# Patient Record
Sex: MALE | Race: BLACK OR AFRICAN AMERICAN | Employment: OTHER | URBAN - METROPOLITAN AREA
[De-identification: names, ages, dates, MRNs, and addresses within clinical notes are randomized per-mention and may not be internally consistent; named-entity substitution may affect disease eponyms.]

---

## 2017-02-02 ENCOUNTER — OFFICE VISIT (OUTPATIENT)
Dept: FAMILY MEDICINE CLINIC | Age: 67
End: 2017-02-02

## 2017-02-02 VITALS
HEIGHT: 69 IN | RESPIRATION RATE: 12 BRPM | WEIGHT: 136 LBS | OXYGEN SATURATION: 98 % | TEMPERATURE: 97.7 F | DIASTOLIC BLOOD PRESSURE: 87 MMHG | SYSTOLIC BLOOD PRESSURE: 131 MMHG | HEART RATE: 68 BPM | BODY MASS INDEX: 20.14 KG/M2

## 2017-02-02 DIAGNOSIS — J40 BRONCHITIS: Primary | ICD-10-CM

## 2017-02-02 RX ORDER — ALBUTEROL SULFATE 0.83 MG/ML
2.5 SOLUTION RESPIRATORY (INHALATION) ONCE
Qty: 1 EACH | Refills: 0
Start: 2017-02-02 | End: 2017-02-02

## 2017-02-02 RX ORDER — CEFDINIR 300 MG/1
300 CAPSULE ORAL 2 TIMES DAILY
Qty: 20 CAP | Refills: 0 | Status: SHIPPED | OUTPATIENT
Start: 2017-02-02 | End: 2017-02-12

## 2017-02-02 RX ORDER — PREDNISONE 10 MG/1
TABLET ORAL
Qty: 1 PACKAGE | Refills: 0 | Status: SHIPPED | OUTPATIENT
Start: 2017-02-02 | End: 2017-05-17

## 2017-02-02 NOTE — PROGRESS NOTES
Here for cough with brown sputum and congestion x 4 weeks. States that he has taken 2 rounds of Mucinex with no relief.

## 2017-02-02 NOTE — PATIENT INSTRUCTIONS
Bronchitis: Care Instructions  Your Care Instructions    Bronchitis is inflammation of the bronchial tubes, which carry air to the lungs. The tubes swell and produce mucus, or phlegm. The mucus and inflamed bronchial tubes make you cough. You may have trouble breathing. Most cases of bronchitis are caused by viruses like those that cause colds. Antibiotics usually do not help and they may be harmful. Bronchitis usually develops rapidly and lasts about 2 to 3 weeks in otherwise healthy people. Follow-up care is a key part of your treatment and safety. Be sure to make and go to all appointments, and call your doctor if you are having problems. It's also a good idea to know your test results and keep a list of the medicines you take. How can you care for yourself at home? · Take all medicines exactly as prescribed. Call your doctor if you think you are having a problem with your medicine. · Get some extra rest.  · Take an over-the-counter pain medicine, such as acetaminophen (Tylenol), ibuprofen (Advil, Motrin), or naproxen (Aleve) to reduce fever and relieve body aches. Read and follow all instructions on the label. · Do not take two or more pain medicines at the same time unless the doctor told you to. Many pain medicines have acetaminophen, which is Tylenol. Too much acetaminophen (Tylenol) can be harmful. · Take an over-the-counter cough medicine that contains dextromethorphan to help quiet a dry, hacking cough so that you can sleep. Avoid cough medicines that have more than one active ingredient. Read and follow all instructions on the label. · Breathe moist air from a humidifier, hot shower, or sink filled with hot water. The heat and moisture will thin mucus so you can cough it out. · Do not smoke. Smoking can make bronchitis worse. If you need help quitting, talk to your doctor about stop-smoking programs and medicines. These can increase your chances of quitting for good.   When should you call for help? Call 911 anytime you think you may need emergency care. For example, call if:  · You have severe trouble breathing. Call your doctor now or seek immediate medical care if:  · You have new or worse trouble breathing. · You cough up dark brown or bloody mucus (sputum). · You have a new or higher fever. · You have a new rash. Watch closely for changes in your health, and be sure to contact your doctor if:  · You cough more deeply or more often, especially if you notice more mucus or a change in the color of your mucus. · You are not getting better as expected. Where can you learn more? Go to http://yony-charla.info/. Enter H333 in the search box to learn more about \"Bronchitis: Care Instructions. \"  Current as of: May 23, 2016  Content Version: 11.1  © 2685-4775 [employer]. Care instructions adapted under license by [employer] (which disclaims liability or warranty for this information). If you have questions about a medical condition or this instruction, always ask your healthcare professional. [address] any warranty or liability for your use of this information.

## 2017-02-02 NOTE — PROGRESS NOTES
Chief Complaint   Patient presents with    Cough with sputum    Nasal Congestion     he is a 61y.o. year old male who presents for evalution. Pt states has been having cough and congestion for past 4 weeks. Has been taking Mucinex D but not helping at all. Wheezing and chest tightness for past 2 weeks. Worse at night time. No fever or chills. Reviewed PmHx, RxHx, FmHx, SocHx, AllgHx and updated and dated in the chart. Review of Systems - negative except as listed above in the HPI    Objective:     Vitals:    02/02/17 0839   BP: 131/87   Pulse: 68   Resp: 12   Temp: 97.7 °F (36.5 °C)   SpO2: 98%   Weight: 136 lb (61.7 kg)   Height: 5' 9\" (1.753 m)     Physical Examination: General appearance - alert, well appearing, and in no distress  Ears - bilateral TM's and external ear canals normal  Nose - normal nontender sinuses, mucosal congestion and mucosal erythema  Mouth - mucous membranes moist, pharynx normal without lesions and erythematous  Neck - supple, no significant adenopathy  Chest - clear to auscultation, no wheezes, rales or rhonchi, symmetric air entry, decreased air entry noted generalized, slight improvement after neb   Heart - normal rate, regular rhythm, normal S1, S2, no murmurs, rubs, clicks or gallops    Assessment/ Plan:   Jessa Mcfarland was seen today for cough with sputum and nasal congestion. Diagnoses and all orders for this visit:    Bronchitis  -     albuterol (PROVENTIL VENTOLIN) 2.5 mg /3 mL (0.083 %) nebulizer solution; 3 mL by Nebulization route once for 1 dose. -     ALBUTEROL, INHAL. WQO.()  -     INHAL RX, AIRWAY OBST/DX SPUTUM INDUCT ([id number])  -     cefdinir (OMNICEF) 300 mg capsule; Take 1 Cap by mouth two (2) times a day for 10 days. -     predniSONE (STERAPRED DS) 10 mg dose pack; Use as directed on packaging x 12 days  New rxs. Discussed and encouraged rest and clear fluids, if congestion is thick should avoid dairy.   Recommended hot showers with steam and elevating head of bed. May use Vitamin C or Echinacea in addition to current therapy. Pt voiced understanding regarding plan of care. Follow-up Disposition:  Return if symptoms worsen or fail to improve. I have discussed the diagnosis with the patient and the intended plan as seen in the above orders. The patient has received an after-visit summary and questions were answered concerning future plans.      Medication Side Effects and Warnings were discussed with patient    Karoline Prabhakar NP

## 2017-02-02 NOTE — MR AVS SNAPSHOT
Visit Information Date & Time Provider Department Dept. Phone Encounter #  
 2/2/2017  8:30 AM Tamara Trinh NP [address] [facility] [phone] [id number] Follow-up Instructions Return if symptoms worsen or fail to improve. Upcoming Health Maintenance Date Due Hepatitis C Screening 1950 Pneumococcal 19-64 Medium Risk (1 of 1 - PPSV23) 8/20/1971 DTaP/Tdap/Td series (1 - Tdap) 8/20/1973 HEMOGLOBIN A1C Q6M 9/25/2016 LIPID PANEL Q1 3/25/2017 COLONOSCOPY 8/5/2018 Allergies as of 2/2/2017  Review Complete On: 2/2/2017 By: Tamara Trinh NP Severity Noted Reaction Type Reactions Pcn [Penicillins]  05/25/2010    Other (comments) Lock face Sulfa (Sulfonamide Antibiotics)  05/25/2010    Rash Current Immunizations  Reviewed on 10/20/2016 Name Date Influenza Vaccine 10/14/2016, 9/1/2015 Influenza Vaccine Split 10/9/2012, 11/4/2011, 11/18/2010 Not reviewed this visit You Were Diagnosed With   
  
 Codes Comments Bronchitis    -  Primary ICD-10-CM: A30 ICD-9-CM: 305 Vitals BP Pulse Temp Resp Height(growth percentile) Weight(growth percentile) 131/87 68 97.7 °F (36.5 °C) 12 5' 9\" (1.753 m) 136 lb (61.7 kg) SpO2 BMI Smoking Status 98% 20.08 kg/m2 Former Smoker Vitals History BMI and BSA Data Body Mass Index Body Surface Area 20.08 kg/m 2 1.73 m 2 Preferred Pharmacy Pharmacy Name Phone [facility] [address] [phone] Your Updated Medication List  
  
   
This list is accurate as of: 2/2/17  9:06 AM.  Always use your most recent med list.  
  
  
  
  
 albuterol 2.5 mg /3 mL (0.083 %) nebulizer solution Commonly known as:  PROVENTIL VENTOLIN  
3 mL by Nebulization route once for 1 dose. amLODIPine 10 mg tablet Commonly known as:  Kath Reyes Take 1 Tab by mouth daily. (dc the 30 day supply already sent) aspirin 81 mg tablet Take 81 mg by mouth daily. atorvastatin 20 mg tablet Commonly known as:  LIPITOR Take 1 Tab by mouth daily. cefdinir 300 mg capsule Commonly known as:  OMNICEF Take 1 Cap by mouth two (2) times a day for 10 days. CENTRUM SILVER Tab tablet Generic drug:  multivitamins-minerals-lutein Take  by mouth.  
  
 cloNIDine HCl 0.2 mg tablet Commonly known as:  CATAPRES Take 1 Tab by mouth two (2) times a day. EFFIENT 10 mg tablet Generic drug:  prasugrel Take 10 mg by mouth daily. glucose blood VI test strips strip Commonly known as:  ONETOUCH ULTRA TEST  
by Does Not Apply route. Needs strips for one touch ultra 2 Once a day testing  
  
 hydroCHLOROthiazide 25 mg tablet Commonly known as:  HYDRODIURIL  
TAKE 1 TABLET ONE TIME DAILY Lancets Misc Commonly known as:  ONETOUCH ULTRASOFT LANCETS  
by Does Not Apply route. Checks bg daily  
  
 lisinopril 20 mg tablet Commonly known as:  PRINIVIL, ZESTRIL  
TAKE 1 TABLET EVERY DAY  
  
 loratadine 10 mg tablet Commonly known as:  CLARITIN  
take 1 tablet by mouth once daily  
  
 metFORMIN  mg tablet Commonly known as:  GLUCOPHAGE XR Take 1 Tab by mouth daily (with dinner). metoprolol succinate 100 mg tablet Commonly known as:  TOPROL-XL Take 1 Tab by mouth daily. NITROSTAT 0.4 mg SL tablet Generic drug:  nitroglycerin  
  
 predniSONE 10 mg dose pack Commonly known as:  STERAPRED DS Use as directed on packaging x 12 days Prescriptions Sent to Pharmacy Refills  
 cefdinir (OMNICEF) 300 mg capsule 0 Sig: Take 1 Cap by mouth two (2) times a day for 10 days. Class: Normal  
 [facility] Ph #: [phone]  Route: Oral  
 predniSONE (STERAPRED DS) 10 mg dose pack 0  
 Sig: Use as directed on packaging x 12 days Class: Normal  
 [facility]  #: [phone] We Performed the Following ALBUTEROL, INHAL. SOL., FDA-APPROVED FINAL, NON-COMPOUND UNIT DOSE, 1 MG [ [facility]] INHAL RX, AIRWAY OBST/DX SPUTUM INDUCT [id number] CPT(R)] Follow-up Instructions Return if symptoms worsen or fail to improve. Patient Instructions Bronchitis: Care Instructions Your Care Instructions Bronchitis is inflammation of the bronchial tubes, which carry air to the lungs. The tubes swell and produce mucus, or phlegm. The mucus and inflamed bronchial tubes make you cough. You may have trouble breathing. Most cases of bronchitis are caused by viruses like those that cause colds. Antibiotics usually do not help and they may be harmful. Bronchitis usually develops rapidly and lasts about 2 to 3 weeks in otherwise healthy people. Follow-up care is a key part of your treatment and safety. Be sure to make and go to all appointments, and call your doctor if you are having problems. It's also a good idea to know your test results and keep a list of the medicines you take. How can you care for yourself at home? · Take all medicines exactly as prescribed. Call your doctor if you think you are having a problem with your medicine. · Get some extra rest. 
· Take an over-the-counter pain medicine, such as acetaminophen (Tylenol), ibuprofen (Advil, Motrin), or naproxen (Aleve) to reduce fever and relieve body aches. Read and follow all instructions on the label. · Do not take two or more pain medicines at the same time unless the doctor told you to. Many pain medicines have acetaminophen, which is Tylenol. Too much acetaminophen (Tylenol) can be harmful. · Take an over-the-counter cough medicine that contains dextromethorphan to help quiet a dry, hacking cough so that you can sleep.  Avoid cough medicines that have more than one active ingredient. Read and follow all instructions on the label. · Breathe moist air from a humidifier, hot shower, or sink filled with hot water. The heat and moisture will thin mucus so you can cough it out. · Do not smoke. Smoking can make bronchitis worse. If you need help quitting, talk to your doctor about stop-smoking programs and medicines. These can increase your chances of quitting for good. When should you call for help? Call 911 anytime you think you may need emergency care. For example, call if: 
· You have severe trouble breathing. Call your doctor now or seek immediate medical care if: 
· You have new or worse trouble breathing. · You cough up dark brown or bloody mucus (sputum). · You have a new or higher fever. · You have a new rash. Watch closely for changes in your health, and be sure to contact your doctor if: 
· You cough more deeply or more often, especially if you notice more mucus or a change in the color of your mucus. · You are not getting better as expected. Where can you learn more? Go to http://yony-charla.info/. Enter H333 in the search box to learn more about \"Bronchitis: Care Instructions. \" Current as of: May 23, 2016 Content Version: 11.1 © 6923-4177 [employer]. Care instructions adapted under license by [employer] (which disclaims liability or warranty for this information). If you have questions about a medical condition or this instruction, always ask your healthcare professional. [place] [address] any warranty or liability for your use of this information. Introducing hospitals & HEALTH SERVICES! Mai Bullard introduces [employer] patient portal. Now you can access parts of your medical record, email your doctor's office, and request medication refills online. 1. In your internet browser, go to [email or web address]. [email or web address] 2. Click on the First Time User? Click Here link in the Sign In box. You will see the New Member Sign Up page. 3. Enter your M[employer]Space Access Code exactly as it appears below. You will not need to use this code after youve completed the sign-up process. If you do not sign up before the expiration date, you must request a new code. · [employer] Access Code: [email or web address] Expires: 5/3/2017  9:00 AM 
 
4. Enter the last four digits of your Social Security Number (xxxx) and Date of Birth (mm/dd/yyyy) as indicated and click Submit. You will be taken to the next sign-up page. 5. Create a MocoSpace ID. This will be your M[employer]Space login ID and cannot be changed, so think of one that is secure and easy to remember. 6. Create a [employer] password. You can change your password at any time. 7. Enter your Password Reset Question and Answer. This can be used at a later time if you forget your password. 8. Enter your e-mail address. You will receive e-mail notification when new information is available in [address] 9. Click Sign Up. You can now view and download portions of your medical record. 10. Click the Download Summary menu link to download a portable copy of your medical information. If you have questions, please visit the Frequently Asked Questions section of the [employer] website. Remember, [employer] is NOT to be used for urgent needs. For medical emergencies, dial 911. Now available from your iPhone and Android! Please provide this summary of care documentation to your next provider. Your primary care clinician is listed as Brigido Dockery. If you have any questions after today's visit, please call [phone].

## 2017-02-09 ENCOUNTER — OFFICE VISIT (OUTPATIENT)
Dept: FAMILY MEDICINE CLINIC | Age: 67
End: 2017-02-09

## 2017-02-09 VITALS
OXYGEN SATURATION: 98 % | BODY MASS INDEX: 21.03 KG/M2 | SYSTOLIC BLOOD PRESSURE: 137 MMHG | HEART RATE: 74 BPM | RESPIRATION RATE: 12 BRPM | WEIGHT: 142 LBS | HEIGHT: 69 IN | TEMPERATURE: 97.9 F | DIASTOLIC BLOOD PRESSURE: 66 MMHG

## 2017-02-09 DIAGNOSIS — J18.9 CAP (COMMUNITY ACQUIRED PNEUMONIA): Primary | ICD-10-CM

## 2017-02-09 RX ORDER — ALBUTEROL SULFATE 90 UG/1
2 AEROSOL, METERED RESPIRATORY (INHALATION)
Qty: 1 INHALER | Refills: 0 | Status: SHIPPED | OUTPATIENT
Start: 2017-02-09

## 2017-02-09 RX ORDER — LEVOFLOXACIN 750 MG/1
750 TABLET ORAL DAILY
Qty: 7 TAB | Refills: 0 | Status: SHIPPED | OUTPATIENT
Start: 2017-02-09 | End: 2017-02-16

## 2017-02-09 RX ORDER — ALBUTEROL SULFATE 0.83 MG/ML
2.5 SOLUTION RESPIRATORY (INHALATION)
Qty: 24 EACH | Refills: 0 | Status: SHIPPED | OUTPATIENT
Start: 2017-02-09

## 2017-02-09 NOTE — MR AVS SNAPSHOT
Visit Information Date & Time Provider Department Dept. Phone Encounter #  
 2/9/2017  3:50 PM Alonzo Mauricio, NP [address] [facility] [phone] [id number] Follow-up Instructions Return if symptoms worsen or fail to improve. Upcoming Health Maintenance Date Due Hepatitis C Screening 1950 Pneumococcal 19-64 Medium Risk (1 of 1 - PPSV23) 8/20/1971 DTaP/Tdap/Td series (1 - Tdap) 8/20/1973 HEMOGLOBIN A1C Q6M 9/25/2016 LIPID PANEL Q1 3/25/2017 COLONOSCOPY 8/5/2018 Allergies as of 2/9/2017  Review Complete On: 2/9/2017 By: Tae Toro LPN Severity Noted Reaction Type Reactions Pcn [Penicillins]  05/25/2010    Other (comments) Lock face Sulfa (Sulfonamide Antibiotics)  05/25/2010    Rash Current Immunizations  Reviewed on 10/20/2016 Name Date Influenza Vaccine 10/14/2016, 9/1/2015 Influenza Vaccine Split 10/9/2012, 11/4/2011, 11/18/2010 Not reviewed this visit You Were Diagnosed With   
  
 Codes Comments CAP (community acquired pneumonia)    -  Primary ICD-10-CM: J18.9 ICD-9-CM: 438 Vitals BP Pulse Temp Resp Height(growth percentile) Weight(growth percentile)  
 137/66 74 97.9 °F (36.6 °C) 12 5' 9\" (1.753 m) 142 lb (64.4 kg) SpO2 BMI Smoking Status 98% 20.97 kg/m2 Former Smoker Vitals History BMI and BSA Data Body Mass Index Body Surface Area  
 20.97 kg/m 2 1.77 m 2 Preferred Pharmacy Pharmacy Name Phone [facility] [address] [phone] Your Updated Medication List  
  
   
This list is accurate as of: 2/9/17  4:12 PM.  Always use your most recent med list.  
  
  
  
  
 albuterol 90 mcg/actuation inhaler Commonly known as:  PROVENTIL HFA, VENTOLIN HFA, PROAIR HFA Take 2 Puffs by inhalation every four (4) hours as needed for Wheezing or Shortness of Breath. amLODIPine 10 mg tablet Commonly known as:  Jose Francisco Radu Take 1 Tab by mouth daily. (dc the 30 day supply already sent) aspirin 81 mg tablet Take 81 mg by mouth daily. atorvastatin 20 mg tablet Commonly known as:  LIPITOR Take 1 Tab by mouth daily. cefdinir 300 mg capsule Commonly known as:  OMNICEF Take 1 Cap by mouth two (2) times a day for 10 days. CENTRUM SILVER Tab tablet Generic drug:  multivitamins-minerals-lutein Take  by mouth.  
  
 cloNIDine HCl 0.2 mg tablet Commonly known as:  CATAPRES Take 1 Tab by mouth two (2) times a day. EFFIENT 10 mg tablet Generic drug:  prasugrel Take 10 mg by mouth daily. glucose blood VI test strips strip Commonly known as:  ONETOUCH ULTRA TEST  
by Does Not Apply route. Needs strips for one touch ultra 2 Once a day testing  
  
 hydroCHLOROthiazide 25 mg tablet Commonly known as:  HYDRODIURIL  
TAKE 1 TABLET ONE TIME DAILY Lancets Misc Commonly known as:  ONETOUCH ULTRASOFT LANCETS  
by Does Not Apply route. Checks bg daily  
  
 levoFLOXacin 750 mg tablet Commonly known as:  Rabia Guile Take 1 Tab by mouth daily for 7 days. lisinopril 20 mg tablet Commonly known as:  PRINIVIL, ZESTRIL  
TAKE 1 TABLET EVERY DAY  
  
 loratadine 10 mg tablet Commonly known as:  CLARITIN  
take 1 tablet by mouth once daily  
  
 metFORMIN  mg tablet Commonly known as:  GLUCOPHAGE XR Take 1 Tab by mouth daily (with dinner). metoprolol succinate 100 mg tablet Commonly known as:  TOPROL-XL Take 1 Tab by mouth daily. NITROSTAT 0.4 mg SL tablet Generic drug:  nitroglycerin  
  
 predniSONE 10 mg dose pack Commonly known as:  STERAPRED DS Use as directed on packaging x 12 days Prescriptions Sent to Pharmacy Refills  
 levoFLOXacin (LEVAQUIN) 750 mg tablet 0 Sig: Take 1 Tab by mouth daily for 7 days.   
 Class: Normal  
 Pharmacy: [facility], [address] [place] [address] ROAD Ph #: [phone] Route: Oral  
 albuterol (PROVENTIL HFA, VENTOLIN HFA, PROAIR HFA) 90 mcg/actuation inhaler 0 Sig: Take 2 Puffs by inhalation every four (4) hours as needed for Wheezing or Shortness of Breath. Class: Normal  
 [facility] Ph #: [phone] Route: Inhalation Follow-up Instructions Return if symptoms worsen or fail to improve. To-Do List   
 02/10/2017 Imaging:  XR CHEST PA LAT Patient Instructions Pneumonia: Care Instructions Your Care Instructions Pneumonia is an infection of the lungs. Most cases are caused by infections from bacteria or viruses. Pneumonia may be mild or very severe. If it is caused by bacteria, you will be treated with antibiotics. It may take a few weeks to a few months to recover fully from pneumonia, depending on how sick you were and whether your overall health is good. Follow-up care is a key part of your treatment and safety. Be sure to make and go to all appointments, and call your doctor if you are having problems. Its also a good idea to know your test results and keep a list of the medicines you take. How can you care for yourself at home? · Take your antibiotics exactly as directed. Do not stop taking the medicine just because you are feeling better. You need to take the full course of antibiotics. · Take your medicines exactly as prescribed. Call your doctor if you think you are having a problem with your medicine. · Get plenty of rest and sleep. You may feel weak and tired for a while, but your energy level will improve with time. · To prevent dehydration, drink plenty of fluids, enough so that your urine is light yellow or clear like water. Choose water and other caffeine-free clear liquids until you feel better.  If you have kidney, heart, or liver disease and have to limit fluids, talk with your doctor before you increase the amount of fluids you drink. · Take care of your cough so you can rest. A cough that brings up mucus from your lungs is common with pneumonia. It is one way your body gets rid of the infection. But if coughing keeps you from resting or causes severe fatigue and chest-wall pain, talk to your doctor. He or she may suggest that you take a medicine to reduce the cough. · Use a vaporizer or humidifier to add moisture to your bedroom. Follow the directions for cleaning the machine. · Do not smoke or allow others to smoke around you. Smoke will make your cough last longer. If you need help quitting, talk to your doctor about stop-smoking programs and medicines. These can increase your chances of quitting for good. · Take an over-the-counter pain medicine, such as acetaminophen (Tylenol), ibuprofen (Advil, Motrin), or naproxen (Aleve). Read and follow all instructions on the label. · Do not take two or more pain medicines at the same time unless the doctor told you to. Many pain medicines have acetaminophen, which is Tylenol. Too much acetaminophen (Tylenol) can be harmful. · If you were given a spirometer to measure how well your lungs are working, use it as instructed. This can help your doctor tell how your recovery is going. · To prevent pneumonia in the future, talk to your doctor about getting a flu vaccine (once a year) and a pneumococcal vaccine (one time only for most people). When should you call for help? Call 911 anytime you think you may need emergency care. For example, call if: 
· You have severe trouble breathing. Call your doctor now or seek immediate medical care if: 
· You cough up dark brown or bloody mucus (sputum). · You have new or worse trouble breathing. · You are dizzy or lightheaded, or you feel like you may faint. Watch closely for changes in your health, and be sure to contact your doctor if: 
· You have a new or higher fever. · You are coughing more deeply or more often. · You are not getting better after 2 days (48 hours). · You do not get better as expected. Where can you learn more? Go to http://yony-charla.info/. Enter [id number] in the search box to learn more about \"Pneumonia: Care Instructions. \" Current as of: May 23, 2016 Content Version: 11.1 © 3597-1029 [employer]. Care instructions adapted under license by [employer] (which disclaims liability or warranty for this information). If you have questions about a medical condition or this instruction, always ask your healthcare professional. [address] any warranty or liability for your use of this information. Introducing [facility]! Miguel Leal introduces [employer] patient portal. Now you can access parts of your medical record, email your doctor's office, and request medication refills online. 1. In your internet browser, go to [email or web address] 2. Click on the First Time User? Click Here link in the Sign In box. You will see the New Member Sign Up page. 3. Enter your [employer] Access Code exactly as it appears below. You will not need to use this code after youve completed the sign-up process. If you do not sign up before the expiration date, you must request a new code. · [employer] Access Code: [email or web address] Expires: 5/3/2017  9:00 AM 
 
4. Enter the last four digits of your Social Security Number (xxxx) and Date of Birth (mm/dd/yyyy) as indicated and click Submit. You will be taken to the next sign-up page. 5. Create a [employer] ID. This will be your [employer] login ID and cannot be changed, so think of one that is secure and easy to remember. 6. Create a [employer] password. You can change your password at any time. 7. Enter your Password Reset Question and Answer. This can be used at a later time if you forget your password. 8. Enter your e-mail address. You will receive e-mail notification when new information is available in [address] 9. Click Sign Up. You can now view and download portions of your medical record. 10. Click the Download Summary menu link to download a portable copy of your medical information. If you have questions, please visit the Frequently Asked Questions section of the [employer] website. Remember, [employer] is NOT to be used for urgent needs. For medical emergencies, dial 911. Now available from your iPhone and Android! Please provide this summary of care documentation to your next provider. Your primary care clinician is listed as Dre Eng. If you have any questions after today's visit, please call [phone].

## 2017-02-09 NOTE — PATIENT INSTRUCTIONS
Pneumonia: Care Instructions  Your Care Instructions    Pneumonia is an infection of the lungs. Most cases are caused by infections from bacteria or viruses. Pneumonia may be mild or very severe. If it is caused by bacteria, you will be treated with antibiotics. It may take a few weeks to a few months to recover fully from pneumonia, depending on how sick you were and whether your overall health is good. Follow-up care is a key part of your treatment and safety. Be sure to make and go to all appointments, and call your doctor if you are having problems. Its also a good idea to know your test results and keep a list of the medicines you take. How can you care for yourself at home? · Take your antibiotics exactly as directed. Do not stop taking the medicine just because you are feeling better. You need to take the full course of antibiotics. · Take your medicines exactly as prescribed. Call your doctor if you think you are having a problem with your medicine. · Get plenty of rest and sleep. You may feel weak and tired for a while, but your energy level will improve with time. · To prevent dehydration, drink plenty of fluids, enough so that your urine is light yellow or clear like water. Choose water and other caffeine-free clear liquids until you feel better. If you have kidney, heart, or liver disease and have to limit fluids, talk with your doctor before you increase the amount of fluids you drink. · Take care of your cough so you can rest. A cough that brings up mucus from your lungs is common with pneumonia. It is one way your body gets rid of the infection. But if coughing keeps you from resting or causes severe fatigue and chest-wall pain, talk to your doctor. He or she may suggest that you take a medicine to reduce the cough. · Use a vaporizer or humidifier to add moisture to your bedroom. Follow the directions for cleaning the machine. · Do not smoke or allow others to smoke around you.  Smoke will make your cough last longer. If you need help quitting, talk to your doctor about stop-smoking programs and medicines. These can increase your chances of quitting for good. · Take an over-the-counter pain medicine, such as acetaminophen (Tylenol), ibuprofen (Advil, Motrin), or naproxen (Aleve). Read and follow all instructions on the label. · Do not take two or more pain medicines at the same time unless the doctor told you to. Many pain medicines have acetaminophen, which is Tylenol. Too much acetaminophen (Tylenol) can be harmful. · If you were given a spirometer to measure how well your lungs are working, use it as instructed. This can help your doctor tell how your recovery is going. · To prevent pneumonia in the future, talk to your doctor about getting a flu vaccine (once a year) and a pneumococcal vaccine (one time only for most people). When should you call for help? Call 911 anytime you think you may need emergency care. For example, call if:  · You have severe trouble breathing. Call your doctor now or seek immediate medical care if:  · You cough up dark brown or bloody mucus (sputum). · You have new or worse trouble breathing. · You are dizzy or lightheaded, or you feel like you may faint. Watch closely for changes in your health, and be sure to contact your doctor if:  · You have a new or higher fever. · You are coughing more deeply or more often. · You are not getting better after 2 days (48 hours). · You do not get better as expected. Where can you learn more? Go to http://yony-charla.info/. Enter [id number] in the search box to learn more about \"Pneumonia: Care Instructions. \"  Current as of: May 23, 2016  Content Version: 11.1  © 3203-0634 [employer]. Care instructions adapted under license by [employer] (which disclaims liability or warranty for this information).  If you have questions about a medical condition or this instruction, always ask your healthcare professional. [place] [address] any warranty or liability for your use of this information.

## 2017-02-09 NOTE — PROGRESS NOTES
Here for a follow up on congestion. States that the med he was given at the last visit did not work.

## 2017-02-09 NOTE — PROGRESS NOTES
Chief Complaint   Patient presents with    Nasal Congestion     he is a 61y.o. year old male who presents for evalution. Pt seen here a week ago for Bronchitis, started on Omnicef and Prednisone. Pt states course continues to worsen, productive cough - has brought in bottle with everything he has coughed up today. Feels feverish but hasn't checked temperature at home. Feels some chest tightness but mostly just cough is terrible. Pt upset because he did not get antibiotic over 600mg and because he did not smell antibiotic in his urine. Reviewed PmHx, RxHx, FmHx, SocHx, AllgHx and updated and dated in the chart. Review of Systems - negative except as listed above in the HPI    Objective:     Vitals:    02/09/17 1550   BP: 137/66   Pulse: 74   Resp: 12   Temp: 97.9 °F (36.6 °C)   SpO2: 98%   Weight: 142 lb (64.4 kg)   Height: 5' 9\" (1.753 m)     Physical Examination: General appearance - alert, well appearing, and in no distress  Ears - bilateral TM's and external ear canals normal  Nose - normal nontender sinuses, mucosal congestion and mucosal erythema  Mouth - mucous membranes moist, pharynx normal without lesions and erythematous  Neck - supple, no significant adenopathy  Chest - wheezing noted mild, decreased air entry noted moderate, improved slightly after neb   Heart - normal rate, regular rhythm, normal S1, S2, no murmurs, rubs, clicks or gallops    Assessment/ Plan:   Leisa Robert was seen today for nasal congestion. Diagnoses and all orders for this visit:    CAP (community acquired pneumonia)  -     levoFLOXacin (LEVAQUIN) 750 mg tablet; Take 1 Tab by mouth daily for 7 days. -     albuterol (PROVENTIL HFA, VENTOLIN HFA, PROAIR HFA) 90 mcg/actuation inhaler; Take 2 Puffs by inhalation every four (4) hours as needed for Wheezing or Shortness of Breath. -     XR CHEST PA LAT;  Future  -     albuterol (PROVENTIL VENTOLIN) 2.5 mg /3 mL (0.083 %) nebulizer solution; 3 mL by Nebulization route every four (4) hours as needed for Wheezing. New antibiotic, start on nebulizer if has grandchild's available - if not then use inhaler. Obtain x-ray on Monday if no improvement. Pt voiced understanding. Pt voiced understanding regarding plan of care. Follow-up Disposition:  Return if symptoms worsen or fail to improve. I have discussed the diagnosis with the patient and the intended plan as seen in the above orders. The patient has received an after-visit summary and questions were answered concerning future plans.      Medication Side Effects and Warnings were discussed with patient    Cecelia Le NP

## 2017-03-22 ENCOUNTER — OFFICE VISIT (OUTPATIENT)
Dept: FAMILY MEDICINE CLINIC | Age: 67
End: 2017-03-22

## 2017-03-22 VITALS
HEART RATE: 62 BPM | SYSTOLIC BLOOD PRESSURE: 106 MMHG | WEIGHT: 138.5 LBS | DIASTOLIC BLOOD PRESSURE: 64 MMHG | OXYGEN SATURATION: 100 % | TEMPERATURE: 97.3 F | HEIGHT: 69 IN | RESPIRATION RATE: 16 BRPM | BODY MASS INDEX: 20.51 KG/M2

## 2017-03-22 DIAGNOSIS — J30.2 SEASONAL ALLERGIC RHINITIS, UNSPECIFIED ALLERGIC RHINITIS TRIGGER: ICD-10-CM

## 2017-03-22 DIAGNOSIS — J01.00 ACUTE MAXILLARY SINUSITIS, RECURRENCE NOT SPECIFIED: Primary | ICD-10-CM

## 2017-03-22 RX ORDER — CEFDINIR 300 MG/1
300 CAPSULE ORAL 2 TIMES DAILY
Qty: 20 CAP | Refills: 0 | Status: SHIPPED | OUTPATIENT
Start: 2017-03-22 | End: 2017-04-01

## 2017-03-22 RX ORDER — LORATADINE 10 MG/1
TABLET ORAL
Qty: 90 TAB | Refills: 3 | Status: SHIPPED | OUTPATIENT
Start: 2017-03-22

## 2017-03-22 NOTE — PROGRESS NOTES
HPI/ROS  Patient complains of bilateral ear pressure/pain. Symptoms include congestion, headache described as frontal, lightheadedness, low grade fever, post nasal drip, productive cough with  yellow colored sputum, sinus pressure, tooth pain and vertigo. Onset of symptoms was 5 days ago, gradually worsening since that time. Patient is drinking plenty of fluids. .  Past history is significant for no history of pneumonia or bronchitis. Patient is non-smoker. He is not taking his claritin currently, ran out for the season. No other cold meds on board. Visit Vitals    /64    Pulse 62    Temp 97.3 °F (36.3 °C) (Oral)    Resp 16    Ht 5' 9\" (1.753 m)    Wt 138 lb 8 oz (62.8 kg)    SpO2 100%    BMI 20.45 kg/m2     Physical Examination:   GENERAL ASSESSMENT: well developed and well nourished  SKIN: normal color, no lesions  HEAD: normocephalic  EYES: normal eyes  EARS: external auditory canal: clear and tympanic membrane: yellow, bulging  NOSE: normal external appearance and nares patent  MOUTH: yellow exudates of OP  NECK: normal  CHEST: normal air exchange, no rales, no rhonchi, no wheezes, respiratory effort normal with no retractions  HEART: regular rate and rhythm, normal S1/S2, no murmurs  ABDOMEN:  not examined  EXTREMITY: not examined  NEURO: not examined    Ethan Torres was seen today for follow-up. Diagnoses and all orders for this visit:    Acute maxillary sinusitis, recurrence not specified    Seasonal allergic rhinitis, unspecified allergic rhinitis trigger    Other orders  -     loratadine (CLARITIN) 10 mg tablet; take 1 tablet by mouth once daily  -     cefdinir (OMNICEF) 300 mg capsule; Take 1 Cap by mouth two (2) times a day for 10 days. Reveiwed adr/se of medication  Push fluids, rest, suggested mucinex for congestion and drainage  Recheck 5-7 days if sx not improved. I have discussed the diagnosis with the patient and the intended plan as seen in the above orders.  The patient has received an after-visit summary and questions were answered concerning future plans. Patient conveyed understanding of the plan at the time of the visit.     Adam Kamara, MSN, ANP  3/22/2017

## 2017-03-22 NOTE — PROGRESS NOTES
1. Have you been to the ER, urgent care clinic since your last visit? Hospitalized since your last visit? No    2. Have you seen or consulted any other health care providers outside of the [address] [place] [address] since your last visit? Include any pap smears or colon screening.  No    Chief Complaint   Patient presents with    Follow-up     sinus congestion, eyes itch, productive cough x 4 mo

## 2017-03-22 NOTE — MR AVS SNAPSHOT
Visit Information Date & Time Provider Department Dept. Phone Encounter #  
 3/22/2017  8:30 AM Katya Holt, SALONI [address] [facility] [phone] [id number] Upcoming Health Maintenance Date Due Hepatitis C Screening 1950 Pneumococcal 19-64 Medium Risk (1 of 1 - PPSV23) 8/20/1971 DTaP/Tdap/Td series (1 - Tdap) 8/20/1973 HEMOGLOBIN A1C Q6M 9/25/2016 LIPID PANEL Q1 3/25/2017 COLONOSCOPY 8/5/2018 Allergies as of 3/22/2017  Review Complete On: 3/22/2017 By: Naomi Cowart LPN Severity Noted Reaction Type Reactions Pcn [Penicillins]  05/25/2010    Other (comments) Lock face Sulfa (Sulfonamide Antibiotics)  05/25/2010    Rash Current Immunizations  Reviewed on 10/20/2016 Name Date Influenza Vaccine 10/14/2016, 9/1/2015 Influenza Vaccine Split 10/9/2012, 11/4/2011, 11/18/2010 Not reviewed this visit You Were Diagnosed With   
  
 Codes Comments Acute maxillary sinusitis, recurrence not specified    -  Primary ICD-10-CM: J01.00 ICD-9-CM: 461.0 Seasonal allergic rhinitis, unspecified allergic rhinitis trigger     ICD-10-CM: J30.2 ICD-9-CM: 477.9 Vitals BP Pulse Temp Resp Height(growth percentile) Weight(growth percentile) 106/64 62 97.3 °F (36.3 °C) (Oral) 16 5' 9\" (1.753 m) 138 lb 8 oz (62.8 kg) SpO2 BMI Smoking Status 100% 20.45 kg/m2 Former Smoker Vitals History BMI and BSA Data Body Mass Index Body Surface Area  
 20.45 kg/m 2 1.75 m 2 Preferred Pharmacy Pharmacy Name Phone [facility] [address] [facility] [phone] Your Updated Medication List  
  
   
This list is accurate as of: 3/22/17  9:17 AM.  Always use your most recent med list.  
  
  
  
  
 * albuterol 90 mcg/actuation inhaler Commonly known as:  PROVENTIL HFA, VENTOLIN HFA, PROAIR HFA  
 Take 2 Puffs by inhalation every four (4) hours as needed for Wheezing or Shortness of Breath. * albuterol 2.5 mg /3 mL (0.083 %) nebulizer solution Commonly known as:  PROVENTIL VENTOLIN  
3 mL by Nebulization route every four (4) hours as needed for Wheezing. amLODIPine 10 mg tablet Commonly known as:  Reva Alie Take 1 Tab by mouth daily. (dc the 30 day supply already sent) aspirin 81 mg tablet Take 81 mg by mouth daily. atorvastatin 20 mg tablet Commonly known as:  LIPITOR Take 1 Tab by mouth daily. cefdinir 300 mg capsule Commonly known as:  OMNICEF Take 1 Cap by mouth two (2) times a day for 10 days. CENTRUM SILVER Tab tablet Generic drug:  multivitamins-minerals-lutein Take  by mouth.  
  
 cloNIDine HCl 0.2 mg tablet Commonly known as:  CATAPRES Take 1 Tab by mouth two (2) times a day. EFFIENT 10 mg tablet Generic drug:  prasugrel Take 10 mg by mouth daily. glucose blood VI test strips strip Commonly known as:  ONETOUCH ULTRA TEST  
by Does Not Apply route. Needs strips for one touch ultra 2 Once a day testing  
  
 hydroCHLOROthiazide 25 mg tablet Commonly known as:  HYDRODIURIL  
TAKE 1 TABLET ONE TIME DAILY Lancets Misc Commonly known as:  ONETOUCH ULTRASOFT LANCETS  
by Does Not Apply route. Checks bg daily  
  
 lisinopril 20 mg tablet Commonly known as:  PRINIVIL, ZESTRIL  
TAKE 1 TABLET EVERY DAY  
  
 loratadine 10 mg tablet Commonly known as:  CLARITIN  
take 1 tablet by mouth once daily  
  
 metFORMIN  mg tablet Commonly known as:  GLUCOPHAGE XR Take 1 Tab by mouth daily (with dinner). metoprolol succinate 100 mg tablet Commonly known as:  TOPROL-XL Take 1 Tab by mouth daily. NITROSTAT 0.4 mg SL tablet Generic drug:  nitroglycerin  
  
 predniSONE 10 mg dose pack Commonly known as:  STERAPRED DS Use as directed on packaging x 12 days * Notice: This list has 2 medication(s) that are the same as other medications prescribed for you. Read the directions carefully, and ask your doctor or other care provider to review them with you. Prescriptions Sent to Pharmacy Refills  
 loratadine (CLARITIN) 10 mg tablet 3 Sig: take 1 tablet by mouth once daily Class: Normal  
 [facility]: [facility] [place] [address] Ph #: [phone]  
 cefdinir (OMNICEF) 300 mg capsule 0 Sig: Take 1 Cap by mouth two (2) times a day for 10 days. Class: Normal  
 Pharmacy: [facility] Ph #: [phone] Route: Oral  
  
Introducing [facility]! [employer] introduces [employer] patient portal. Now you can access parts of your medical record, email your doctor's office, and request medication refills online. 1. In your internet browser, go to [email or web address] 2. Click on the First Time User? Click Here link in the Sign In box. You will see the New Member Sign Up page. 3. Enter your [employer] Access Code exactly as it appears below. You will not need to use this code after youve completed the sign-up process. If you do not sign up before the expiration date, you must request a new code. · [employer] Access Code: [email or web address] Expires: 5/3/2017 10:00 AM 
 
4. Enter the last four digits of your Social Security Number (xxxx) and Date of Birth (mm/dd/yyyy) as indicated and click Submit. You will be taken to the next sign-up page. 5. Create a [employer] ID. This will be your [employer] login ID and cannot be changed, so think of one that is secure and easy to remember. 6. Create a [employer] password. You can change your password at any time. 7. Enter your Password Reset Question and Answer. This can be used at a later time if you forget your password. 8. Enter your e-mail address.  You will receive e-mail notification when new information is available in [employer]. 9. Click Sign Up. You can now view and download portions of your medical record. 10. Click the Download Summary menu link to download a portable copy of your medical information. If you have questions, please visit the Frequently Asked Questions section of the [employer] website. Remember, [employer] is NOT to be used for urgent needs. For medical emergencies, dial 911. Now available from your iPhone and Android! Please provide this summary of care documentation to your next provider. Your primary care clinician is listed as Tess Lanza. If you have any questions after today's visit, please call [phone].

## 2017-04-18 RX ORDER — AMLODIPINE BESYLATE 10 MG/1
TABLET ORAL
Qty: 90 TAB | Refills: 3 | Status: SHIPPED | OUTPATIENT
Start: 2017-04-18 | End: 2018-08-27 | Stop reason: SDUPTHER

## 2017-04-18 RX ORDER — HYDROCHLOROTHIAZIDE 25 MG/1
TABLET ORAL
Qty: 90 TAB | Refills: 3 | Status: SHIPPED | OUTPATIENT
Start: 2017-04-18 | End: 2018-08-27 | Stop reason: SDUPTHER

## 2017-04-18 RX ORDER — ATORVASTATIN CALCIUM 20 MG/1
TABLET, FILM COATED ORAL
Qty: 90 TAB | Refills: 3 | Status: SHIPPED | OUTPATIENT
Start: 2017-04-18 | End: 2018-02-13 | Stop reason: SDUPTHER

## 2017-04-18 RX ORDER — METOPROLOL SUCCINATE 100 MG/1
TABLET, EXTENDED RELEASE ORAL
Qty: 90 TAB | Refills: 3 | Status: SHIPPED | OUTPATIENT
Start: 2017-04-18 | End: 2018-08-27 | Stop reason: SDUPTHER

## 2017-04-18 RX ORDER — METFORMIN HYDROCHLORIDE 500 MG/1
TABLET, EXTENDED RELEASE ORAL
Qty: 90 TAB | Refills: 3 | Status: SHIPPED | OUTPATIENT
Start: 2017-04-18 | End: 2018-08-27 | Stop reason: SDUPTHER

## 2017-04-18 RX ORDER — LISINOPRIL 20 MG/1
TABLET ORAL
Qty: 90 TAB | Refills: 3 | Status: SHIPPED | OUTPATIENT
Start: 2017-04-18 | End: 2018-08-27 | Stop reason: SDUPTHER

## 2017-05-17 ENCOUNTER — OFFICE VISIT (OUTPATIENT)
Dept: FAMILY MEDICINE CLINIC | Age: 67
End: 2017-05-17

## 2017-05-17 VITALS
RESPIRATION RATE: 16 BRPM | WEIGHT: 137 LBS | TEMPERATURE: 97.5 F | OXYGEN SATURATION: 99 % | BODY MASS INDEX: 20.29 KG/M2 | HEART RATE: 66 BPM | HEIGHT: 69 IN | DIASTOLIC BLOOD PRESSURE: 64 MMHG | SYSTOLIC BLOOD PRESSURE: 102 MMHG

## 2017-05-17 DIAGNOSIS — J45.31 RAD (REACTIVE AIRWAY DISEASE), MILD PERSISTENT, WITH ACUTE EXACERBATION: ICD-10-CM

## 2017-05-17 DIAGNOSIS — J40 BRONCHITIS: Primary | ICD-10-CM

## 2017-05-17 RX ORDER — MONTELUKAST SODIUM 10 MG/1
10 TABLET ORAL DAILY
Qty: 30 TAB | Refills: 5 | Status: SHIPPED | OUTPATIENT
Start: 2017-05-17

## 2017-05-17 RX ORDER — ALBUTEROL SULFATE 0.83 MG/ML
2.5 SOLUTION RESPIRATORY (INHALATION) ONCE
Qty: 1 EACH | Refills: 0
Start: 2017-05-17 | End: 2017-05-17

## 2017-05-17 RX ORDER — AZITHROMYCIN 250 MG/1
TABLET, FILM COATED ORAL
Qty: 6 TAB | Refills: 0 | Status: SHIPPED | OUTPATIENT
Start: 2017-05-17 | End: 2017-09-26 | Stop reason: ALTCHOICE

## 2017-05-17 RX ORDER — PREDNISONE 10 MG/1
TABLET ORAL
Qty: 1 PACKAGE | Refills: 0 | Status: SHIPPED | OUTPATIENT
Start: 2017-05-17 | End: 2017-09-26 | Stop reason: ALTCHOICE

## 2017-05-17 NOTE — PROGRESS NOTES
1. Have you been to the ER, urgent care clinic since your last visit? Hospitalized since your last visit? No    2. Have you seen or consulted any other health care providers outside of the [address] [place] [address] since your last visit? Include any pap smears or colon screening.  No     Chief Complaint   Patient presents with    Ear Pain     Left, started yesterday    Sinus Infection

## 2017-05-17 NOTE — PROGRESS NOTES
Chief Complaint   Patient presents with    Ear Pain     Left, started yesterday    Sinus Infection     he is a 61y.o. year old male who presents for evalution. Pt states still having issues with his pneumonia, intermittent SOB. Has rescue inhaler at home but only uses as needed - not that frequently. Still having issues with sinus congestion and pressure. Has been taking Claritin daily. Pt has been treated with numerous antibiotics in past few months - Omnicef, Levaquin. Pt states was vomiting early in the week with fever, vomit had a lot of mucus present. Pt frustrated since feels like he has been sick for past 6 months. Reviewed PmHx, RxHx, FmHx, SocHx, AllgHx and updated and dated in the chart. Review of Systems - negative except as listed above in the HPI    Objective:     Vitals:    05/17/17 0914   BP: 102/64   Pulse: 66   Resp: 16   Temp: 97.5 °F (36.4 °C)   TempSrc: Oral   SpO2: 99%   Weight: 137 lb (62.1 kg)   Height: 5' 9\" (1.753 m)     Physical Examination: General appearance - alert, well appearing, and in no distress  Ears - bilateral TM's and external ear canals normal  Nose - normal nontender sinuses, mucosal congestion and mucosal erythema  Mouth - mucous membranes moist, pharynx normal without lesions and erythematous  Neck - bilateral symmetric anterior adenopathy  Chest - clear to auscultation, no wheezes, rales or rhonchi, symmetric air entry, decreased air entry noted moderate, improved after neb   Heart - normal rate, regular rhythm, normal S1, S2, no murmurs, rubs, clicks or gallops    Assessment/ Plan:   Tracie Barney was seen today for ear pain and sinus infection. Diagnoses and all orders for this visit:    Bronchitis  -     albuterol (PROVENTIL VENTOLIN) 2.5 mg /3 mL (0.083 %) nebulizer solution; 3 mL by Nebulization route once for 1 dose.   -     ALBUTEROL, INHAL. PWB.()  -     INHAL RX, AIRWAY OBST/DX SPUTUM INDUCT ([id number])  -     predniSONE (STERAPRED DS) 10 mg dose pack; Use as directed on packaging x 12 days  -     azithromycin (ZITHROMAX) 250 mg tablet; Take 2 tabs po today then 1 tab po x 4 days  New rxs. F/U prn    RAD (reactive airway disease), mild persistent, with acute exacerbation  -     albuterol (PROVENTIL VENTOLIN) 2.5 mg /3 mL (0.083 %) nebulizer solution; 3 mL by Nebulization route once for 1 dose. -     ALBUTEROL, INHAL. EMB.()  -     INHAL RX, AIRWAY OBST/DX SPUTUM INDUCT ([id number])  -     beclomethasone (QVAR) 80 mcg/actuation aero; Take 1 Puff by inhalation two (2) times a day. -     montelukast (SINGULAIR) 10 mg tablet; Take 1 Tab by mouth daily. Take 1 po qd   New rxs. Discussed and encouraged rest and clear fluids, if congestion is thick should avoid dairy. Recommended hot showers with steam and elevating head of bed. May use Vitamin C or Echinacea in addition to current therapy. Pt voiced understanding regarding plan of care. Follow-up Disposition:  Return if symptoms worsen or fail to improve. I have discussed the diagnosis with the patient and the intended plan as seen in the above orders. The patient has received an after-visit summary and questions were answered concerning future plans.      Medication Side Effects and Warnings were discussed with patient    George Uribe NP

## 2017-05-18 ENCOUNTER — DOCUMENTATION ONLY (OUTPATIENT)
Dept: FAMILY MEDICINE CLINIC | Age: 67
End: 2017-05-18

## 2017-05-22 ENCOUNTER — DOCUMENTATION ONLY (OUTPATIENT)
Dept: FAMILY MEDICINE CLINIC | Age: 67
End: 2017-05-22

## 2017-05-22 RX ORDER — FLUTICASONE PROPIONATE 44 UG/1
2 AEROSOL, METERED RESPIRATORY (INHALATION) 2 TIMES DAILY
Qty: 1 INHALER | Refills: 2 | Status: SHIPPED | OUTPATIENT
Start: 2017-05-22

## 2017-05-22 NOTE — PROGRESS NOTES
PA for Qvar denied insurance states must try & fail Flovent Diskus powder, Arnuity Ellipta powder, &/or Flovent HFA.

## 2017-08-11 RX ORDER — CLONIDINE HYDROCHLORIDE 0.2 MG/1
0.2 TABLET ORAL 2 TIMES DAILY
Qty: 180 TAB | Refills: 3 | Status: SHIPPED | OUTPATIENT
Start: 2017-08-11 | End: 2018-08-27 | Stop reason: SDUPTHER

## 2017-09-26 ENCOUNTER — OFFICE VISIT (OUTPATIENT)
Dept: FAMILY MEDICINE CLINIC | Age: 67
End: 2017-09-26

## 2017-09-26 VITALS
HEIGHT: 69 IN | HEART RATE: 72 BPM | DIASTOLIC BLOOD PRESSURE: 62 MMHG | BODY MASS INDEX: 21.36 KG/M2 | RESPIRATION RATE: 16 BRPM | OXYGEN SATURATION: 98 % | TEMPERATURE: 97.6 F | WEIGHT: 144.25 LBS | SYSTOLIC BLOOD PRESSURE: 110 MMHG

## 2017-09-26 DIAGNOSIS — J01.00 ACUTE MAXILLARY SINUSITIS, RECURRENCE NOT SPECIFIED: ICD-10-CM

## 2017-09-26 DIAGNOSIS — E78.00 HYPERCHOLESTEROLEMIA: ICD-10-CM

## 2017-09-26 DIAGNOSIS — Z11.59 ENCOUNTER FOR HEPATITIS C SCREENING TEST FOR LOW RISK PATIENT: ICD-10-CM

## 2017-09-26 DIAGNOSIS — Z00.00 WELL ADULT EXAM: Primary | ICD-10-CM

## 2017-09-26 DIAGNOSIS — E11.9 TYPE 2 DIABETES MELLITUS WITHOUT COMPLICATION, WITHOUT LONG-TERM CURRENT USE OF INSULIN (HCC): ICD-10-CM

## 2017-09-26 DIAGNOSIS — I10 ESSENTIAL HYPERTENSION: ICD-10-CM

## 2017-09-26 DIAGNOSIS — Z23 ENCOUNTER FOR IMMUNIZATION: ICD-10-CM

## 2017-09-26 DIAGNOSIS — N40.1 BENIGN PROSTATIC HYPERPLASIA WITH LOWER URINARY TRACT SYMPTOMS, UNSPECIFIED MORPHOLOGY: ICD-10-CM

## 2017-09-26 RX ORDER — TAMSULOSIN HYDROCHLORIDE 0.4 MG/1
0.4 CAPSULE ORAL DAILY
Qty: 90 CAP | Refills: 3 | Status: SHIPPED | OUTPATIENT
Start: 2017-09-26

## 2017-09-26 RX ORDER — AZITHROMYCIN 250 MG/1
TABLET, FILM COATED ORAL
Qty: 6 TAB | Refills: 0 | Status: SHIPPED | OUTPATIENT
Start: 2017-09-26 | End: 2018-02-12

## 2017-09-26 NOTE — PATIENT INSTRUCTIONS

## 2017-09-26 NOTE — MR AVS SNAPSHOT
Visit Information Date & Time Provider Department Dept. Phone Encounter #  
 9/26/2017 10:00 AM Nic Wyatt, SALONI [address] [facility] [phone] [id number] Upcoming Health Maintenance Date Due Hepatitis C Screening 1950 DTaP/Tdap/Td series (1 - Tdap) 8/20/1973 HEMOGLOBIN A1C Q6M 9/25/2016 LIPID PANEL Q1 3/25/2017 INFLUENZA AGE 9 TO ADULT 8/1/2017 GLAUCOMA SCREENING Q2Y 8/20/2017 Pneumococcal 65+ Low/Medium Risk (1 of 2 - PCV13) 8/20/2017 MEDICARE YEARLY EXAM 8/20/2017 COLONOSCOPY 8/5/2018 Allergies as of 9/26/2017  Review Complete On: 9/26/2017 By: Kd Rios, LAKE Severity Noted Reaction Type Reactions Pcn [Penicillins]  05/25/2010    Other (comments) Lock face Sulfa (Sulfonamide Antibiotics)  05/25/2010    Rash Current Immunizations  Reviewed on 10/20/2016 Name Date Influenza High Dose Vaccine PF  Incomplete Influenza Vaccine 10/14/2016, 9/1/2015 Influenza Vaccine Split 10/9/2012, 11/4/2011, 11/18/2010 Not reviewed this visit You Were Diagnosed With   
  
 Codes Comments Well adult exam    -  Primary ICD-10-CM: Z00.00 ICD-9-CM: V70.0 Type 2 diabetes mellitus without complication, without long-term current use of insulin (HCC)     ICD-10-CM: E11.9 ICD-9-CM: 250.00 Essential hypertension     ICD-10-CM: I10 
ICD-9-CM: 401.9 Hypercholesterolemia     ICD-10-CM: E78.00 ICD-9-CM: 272.0 Benign prostatic hyperplasia with lower urinary tract symptoms, unspecified morphology     ICD-10-CM: N40.1 ICD-9-CM: 600.01 Encounter for hepatitis C screening test for low risk patient     ICD-10-CM: Z11.59 
ICD-9-CM: V73.89 Encounter for immunization     ICD-10-CM: K59 ICD-9-CM: V03.89 Acute maxillary sinusitis, recurrence not specified     ICD-10-CM: J01.00 ICD-9-CM: 461.0 Vitals BP Pulse Temp Resp Height(growth percentile) Weight(growth percentile) 110/62 72 97.6 °F (36.4 °C) (Oral) 16 5' 9\" (1.753 m) 144 lb 4 oz (65.4 kg) SpO2 BMI Smoking Status 98% 21.3 kg/m2 Former Smoker Vitals History BMI and BSA Data Body Mass Index Body Surface Area  
 21.3 kg/m 2 1.78 m 2 Preferred Pharmacy [facility]acy Name Phone [facility] [address] [phone] Your Updated Medication List  
  
   
This list is accurate as of: 9/26/17 10:25 AM.  Always use your most recent med list.  
  
  
  
  
 * albuterol 90 mcg/actuation inhaler Commonly known as:  PROVENTIL HFA, VENTOLIN HFA, PROAIR HFA Take 2 Puffs by inhalation every four (4) hours as needed for Wheezing or Shortness of Breath. * albuterol 2.5 mg /3 mL (0.083 %) nebulizer solution Commonly known as:  PROVENTIL VENTOLIN  
3 mL by Nebulization route every four (4) hours as needed for Wheezing. amLODIPine 10 mg tablet Commonly known as:  Andree Raddle TAKE 1 TABLET EVERY DAY  
  
 aspirin 81 mg tablet Take 81 mg by mouth daily. atorvastatin 20 mg tablet Commonly known as:  LIPITOR  
TAKE 1 TABLET EVERY DAY  
  
 azithromycin 250 mg tablet Commonly known as:  Prasad Breed Take two tablets today then one tablet daily CENTRUM SILVER Tab tablet Generic drug:  multivitamins-minerals-lutein Take  by mouth.  
  
 cloNIDine HCl 0.2 mg tablet Commonly known as:  CATAPRES Take 1 Tab by mouth two (2) times a day. fluticasone 44 mcg/actuation inhaler Commonly known as:  FLOVENT HFA Take 2 Puffs by inhalation two (2) times a day. glucose blood VI test strips strip Commonly known as:  ONETOUCH ULTRA TEST  
by Does Not Apply route. Needs strips for one touch ultra 2 Once a day testing  
  
 hydroCHLOROthiazide 25 mg tablet Commonly known as:  HYDRODIURIL  
TAKE 1 TABLET ONE TIME DAILY Lancets Misc Commonly known as:  ONETOUCH ULTRASOFT LANCETS  
 by Does Not Apply route. Checks bg daily  
  
 lisinopril 20 mg tablet Commonly known as:  PRINIVIL, ZESTRIL  
TAKE 1 TABLET EVERY DAY  
  
 loratadine 10 mg tablet Commonly known as:  CLARITIN  
take 1 tablet by mouth once daily  
  
 metFORMIN  mg tablet Commonly known as:  GLUCOPHAGE XR  
TAKE 1 TABLET DAILY (WITH DINNER). metoprolol succinate 100 mg tablet Commonly known as:  TOPROL-XL  
TAKE 1 TABLET EVERY DAY  
  
 montelukast 10 mg tablet Commonly known as:  SINGULAIR Take 1 Tab by mouth daily. Take 1 po qd  
  
 pneumococcal 13 papito conj dip 0.5 mL Syrg injection Commonly known as:  PREVNAR-13  
0.5 mL by IntraMUSCular route once for 1 dose. tamsulosin 0.4 mg capsule Commonly known as:  FLOMAX Take 1 Cap by mouth daily. * Notice: This list has 2 medication(s) that are the same as other medications prescribed for you. Read the directions carefully, and ask your doctor or other care provider to review them with you. Prescriptions Printed Refills  
 pneumococcal 13 papito conj dip (PREVNAR-13) 0.5 mL syrg injection 0 Si.5 mL by IntraMUSCular route once for 1 dose. Class: Print Route: IntraMUSCular Prescriptions Sent to Pharmacy Refills  
 tamsulosin (FLOMAX) 0.4 mg capsule 3 Sig: Take 1 Cap by mouth daily. Class: Normal  
 Pharmacy: [address] [place] [address], [address] [place] [address] Ph #: [phone] Route: Oral  
 azithromycin (ZITHROMAX Z-DELORES) 250 mg tablet 0 Sig: Take two tablets today then one tablet daily Class: Normal  
 Pharmacy: [facility] Ph #: [phone] We Performed the Following ADMIN INFLUENZA VIRUS VAC [ [facility]] CBC WITH AUTOMATED DIFF [13684 CPT(R)] HEMOGLOBIN A1C WITH EAG [85691 CPT(R)] HEPATITIS C AB [03059 CPT(R)] INFLUENZA VIRUS VACCINE, HIGH DOSE SEASONAL, PRESERVATIVE FREE [09379 CPT(R)] LIPID PANEL [75358 CPT(R)] METABOLIC PANEL, COMPREHENSIVE [74015 CPT(R)] MICROALBUMIN, UR, RAND W/ MICROALBUMIN/CREA RATIO [id number] CPT(R)] PSA SCREENING (SCREENING) [ \[facility]\""] REFERRAL TO OPHTHALMOLOGY [REF57 Custom] TSH 3RD GENERATION [68663 CPT(R)] Referral Information Referral ID Referred By Referred To  
  
 [id number] Julianna DELGADO [address] [place] [address] [place] [address] Phone: [phone] Fax: [phone] Visits Status Start Date End Date 1 New Request 9/26/17 9/26/18 If your referral has a status of pending review or denied, additional information will be sent to support the outcome of this decision. Patient Instructions Medicare Wellness Visit, Male The best way to live healthy is to have a healthy lifestyle by eating a well-balanced diet, exercising regularly, limiting alcohol and stopping smoking. Regular physical exams and screening tests are another way to keep healthy. Preventive exams provided by your health care provider can find health problems before they become diseases or illnesses. Preventive services including immunizations, screening tests, monitoring and exams can help you take care of your own health. All people over age 72 should have a pneumovax  and and a prevnar shot to prevent pneumonia. These are once in a lifetime unless you and your provider decide differently. All people over 65 should have a yearly flu shot and a tetanus vaccine every 10 years. Screening for diabetes mellitus with a blood sugar test should be done every year. Glaucoma is a disease of the eye due to increased ocular pressure that can lead to blindness and it should be done every year by an eye professional. 
 
Cardiovascular screening tests that check for elevated lipids (fatty part of blood) which can lead to heart disease and strokes should be done every 5 years. Colorectal screening that evaluates for blood or polyps in your colon should be done yearly as a stool test or every five years as a flexible sigmoidoscope or every 10 years as a colonoscopy up to age 76. Men up to age 76 may need a screening blood test for prostate cancer at certain intervals, depending on their personal and family history. This decision is between the patient and his provider. If you have been a smoker or had family history of abdominal aortic aneurysms, you and your provider may decide to schedule an ultrasound test of your aorta. Hepatitis C screening is also recommended for anyone born between [birth year] through [address]. A shingles vaccine is also recommended once in a lifetime after age 61. Your Medicare Wellness Exam is recommended annually. Here is a list of your current Health Maintenance items with a due date: 
Health Maintenance Due Topic Date Due  
 Hepatitis C Test  1950  DTaP/Tdap/Td  (1 - Tdap) 08/20/1973  Hemoglobin A1C    09/25/2016  Cholesterol Test   03/25/2017  Flu Vaccine  08/01/2017  Glaucoma Screening   08/20/2017  Pneumococcal Vaccine (1 of 2 - PCV13) 08/20/2017 Dilip Prior Annual Well Visit  08/20/2017 Introducing [facility]! [employer] introduces [employer] patient portal. Now you can access parts of your medical record, email your doctor's office, and request medication refills online. 1. In your internet browser, go to [email or web address] 2. Click on the First Time User? Click Here link in the Sign In box. You will see the New Member Sign Up page. 3. Enter your [employer] Access Code exactly as it appears below. You will not need to use this code after youve completed the sign-up process. If you do not sign up before the expiration date, you must request a new code. · [employer] Access Code: [email or web address] Expires: 12/25/2017 10:25 AM 
 
 4. Enter the last four digits of your Social Security Number (xxxx) and Date of Birth (mm/dd/yyyy) as indicated and click Submit. You will be taken to the next sign-up page. 5. Create a [employer] ID. This will be your [employer] login ID and cannot be changed, so think of one that is secure and easy to remember. 6. Create a [employer] password. You can change your password at any time. 7. Enter your Password Reset Question and Answer. This can be used at a later time if you forget your password. 8. Enter your e-mail address. You will receive e-mail notification when new information is available in [address] 9. Click Sign Up. You can now view and download portions of your medical record. 10. Click the Download Summary menu link to download a portable copy of your medical information. If you have questions, please visit the Frequently Asked Questions section of the [employer] website. Remember, [employer] is NOT to be used for urgent needs. For medical emergencies, dial 911. Now available from your iPhone and Android! Please provide this summary of care documentation to your next provider. Your primary care clinician is listed as Kate Freeman. If you have any questions after today's visit, please call [phone].

## 2017-09-26 NOTE — PROGRESS NOTES
1. Have you been to the ER, urgent care clinic since your last visit? Hospitalized since your last visit? No    2. Have you seen or consulted any other health care providers outside of the [address] [place] [address] since your last visit? Include any pap smears or colon screening.  No    Chief Complaint   Patient presents with    Complete Physical     [place], welcome to medicare    Labs     fasting

## 2017-09-26 NOTE — PROGRESS NOTES
This is a \"Welcome to [employer]"  Initial Preventive Physical Examination (IPPE) providing Personalized Prevention Plan Services (Performed in the first 12 months of enrollment)  I have reviewed the patient's medical history in detail and updated the computerized patient record. 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 90 Cap 3    pneumococcal 13 papito conj dip (PREVNAR-13) 0.5 mL syrg injection 0.5 mL by IntraMUSCular route once for 1 dose. 0.5 mL 0    azithromycin (ZITHROMAX Z-DELORES) 250 mg tablet Take two tablets today then one tablet daily 6 Tab 0    cloNIDine HCl (CATAPRES) 0.2 mg tablet Take 1 Tab by mouth two (2) times a day. 180 Tab 3    fluticasone (FLOVENT HFA) 44 mcg/actuation inhaler Take 2 Puffs by inhalation two (2) times a day. 1 Inhaler 2    montelukast (SINGULAIR) 10 mg tablet Take 1 Tab by mouth daily. Take 1 po qd 30 Tab 5    lisinopril (PRINIVIL, ZESTRIL) 20 mg tablet TAKE 1 TABLET EVERY DAY 90 Tab 3    hydroCHLOROthiazide (HYDRODIURIL) 25 mg tablet TAKE 1 TABLET ONE TIME DAILY 90 Tab 3    metFORMIN ER (GLUCOPHAGE XR) 500 mg tablet TAKE 1 TABLET DAILY (WITH DINNER).  90 Tab 3    amLODIPine (NORVASC) 10 mg tablet TAKE 1 TABLET EVERY DAY 90 Tab 3    metoprolol succinate (TOPROL-XL) 100 mg tablet TAKE 1 TABLET EVERY DAY 90 Tab 3    atorvastatin (LIPITOR) 20 mg tablet TAKE 1 TABLET EVERY DAY 90 Tab 3    loratadine (CLARITIN) 10 mg tablet take 1 tablet by mouth once daily 90 Tab 3    albuterol (PROVENTIL HFA, VENTOLIN HFA, PROAIR HFA) 90 mcg/actuation inhaler Take 2 Puffs by inhalation every four (4) hours as needed for Wheezing or Shortness of Breath. 1 Inhaler 0    albuterol (PROVENTIL VENTOLIN) 2.5 mg /3 mL (0.083 %) nebulizer solution 3 mL by Nebulization route every four (4) hours as needed for Wheezing. 24 Each 0    Lancets (ONE TOUCH ULTRASOFT LANCETS) Misc by Does Not Apply route. Checks bg daily 1 Package 11    glucose blood VI test strips (ONE TOUCH ULTRA TEST) strip by Does Not Apply route. Needs strips for one touch ultra 2  Once a day testing 1 Package 11    multivitamins-minerals-lutein (CENTRUM SILVER) Tab Take  by mouth.  aspirin 81 mg tablet Take 81 mg by mouth daily. Allergies   Allergen Reactions    Pcn [Penicillins] Other (comments)     Lock face    Sulfa (Sulfonamide Antibiotics) Rash     Family History   Problem Relation Age of Onset    Cancer Father     Hypertension Sister     Hypertension Brother     Hypertension Brother     Hypertension Sister     Hypertension Sister      Social History   Substance Use Topics    Smoking status: Former Smoker    Smokeless tobacco: Former User    Alcohol use No     Diet, Lifestyle: No special diet    Exercise level: moderately active    Depression Risk Screen     PHQ over the last two weeks 9/26/2017   Little interest or pleasure in doing things Not at all   Feeling down, depressed or hopeless Not at all   Total Score PHQ 2 0     Alcohol Risk Screen   You do not drink alcohol or very rarely. Functional Ability and Level of Safety   Hearing Loss  Hearing is good. Vision Screening  Vision is The patient needs further evaluation. No exam data present      Activities of Daily Living  The home contains: no safety equipment  Patient does total self care    Fall Risk Screen  Fall Risk Assessment, last 12 mths 9/26/2017   Able to walk? Yes   Fall in past 12 months? No       Abuse Screen  Patient is not abused    Screening EKG   EKG order placed: No    Patient Care Team   Patient Care Team:  Celine Grace NP as PCP - General (Family Practice)     End of Life Planning   Advanced care planning directives were discussed with the patient and /or family/caregiver. Assessment/Plan   Education and counseling provided:  Are appropriate based on today's review and evaluation    Diagnoses and all orders for this visit:    1. Well adult exam  -     CBC WITH AUTOMATED DIFF  -     LIPID PANEL  -     METABOLIC PANEL, COMPREHENSIVE  -     TSH 3RD GENERATION    2. Type 2 diabetes mellitus without complication, without long-term current use of insulin (HCC)  -     HEMOGLOBIN A1C WITH EAG  -     MICROALBUMIN, UR, RAND W/ MICROALBUMIN/CREA RATIO    3. Essential hypertension  -     CBC WITH AUTOMATED DIFF  -     METABOLIC PANEL, COMPREHENSIVE    4. Hypercholesterolemia  -     LIPID PANEL    5. Benign prostatic hyperplasia with lower urinary tract symptoms, unspecified morphology  -     PSA SCREENING (SCREENING) ()    6. Encounter for hepatitis C screening test for low risk patient  -     HEPATITIS C AB    7. Encounter for immunization  -     Influenza virus vaccine ([address]) 72 years and older (61768)  -     Administration fee () for Medicare insured patients    8. Acute maxillary sinusitis, recurrence not specified    Other orders  -     tamsulosin (FLOMAX) 0.4 mg capsule; Take 1 Cap by mouth daily. -     pneumococcal 13 papito conj dip (PREVNAR-13) 0.5 mL syrg injection; 0.5 mL by IntraMUSCular route once for 1 dose. -     azithromycin (ZITHROMAX Z-DELORES) 250 mg tablet;  Take two tablets today then one tablet daily        Health Maintenance Due   Topic Date Due    Hepatitis C Screening  1950    DTaP/Tdap/Td series (1 - Tdap) 08/20/1973    HEMOGLOBIN A1C Q6M  09/25/2016    LIPID PANEL Q1  03/25/2017    INFLUENZA AGE 9 TO ADULT  08/01/2017    GLAUCOMA SCREENING Q2Y  08/20/2017    Pneumococcal 65+ Low/Medium Risk (1 of 2 - PCV13) 08/20/2017    MEDICARE YEARLY EXAM  08/20/2017     I have discussed the diagnosis with the patient and the intended plan as seen in the above orders. The patient has received an after-visit summary and questions were answered concerning future plans. Patient conveyed understanding of the plan at the time of the visit.     Carmen Smith, MSN, ANP  9/26/2017

## 2017-09-27 LAB
ALBUMIN SERPL-MCNC: 3.9 G/DL (ref 3.6–4.8)
ALBUMIN/CREAT UR: <4.1 MG/G CREAT (ref 0–30)
ALBUMIN/GLOB SERPL: 1.5 {RATIO} (ref 1.2–2.2)
ALP SERPL-CCNC: 70 IU/L (ref 39–117)
ALT SERPL-CCNC: 12 IU/L (ref 0–44)
AST SERPL-CCNC: 16 IU/L (ref 0–40)
BASOPHILS # BLD AUTO: 0 X10E3/UL (ref 0–0.2)
BASOPHILS NFR BLD AUTO: 0 %
BILIRUB SERPL-MCNC: 0.4 MG/DL (ref 0–1.2)
BUN SERPL-MCNC: 5 MG/DL (ref 8–27)
BUN/CREAT SERPL: 6 (ref 10–24)
CALCIUM SERPL-MCNC: 9.5 MG/DL (ref 8.6–10.2)
CHLORIDE SERPL-SCNC: 103 MMOL/L (ref 96–106)
CHOLEST SERPL-MCNC: 163 MG/DL (ref 100–199)
CO2 SERPL-SCNC: 25 MMOL/L (ref 18–29)
CREAT SERPL-MCNC: 0.8 MG/DL (ref 0.76–1.27)
CREAT UR-MCNC: 72.7 MG/DL
EOSINOPHIL # BLD AUTO: 0.4 X10E3/UL (ref 0–0.4)
EOSINOPHIL NFR BLD AUTO: 6 %
ERYTHROCYTE [DISTWIDTH] IN BLOOD BY AUTOMATED COUNT: 14.8 % (ref 12.3–15.4)
EST. AVERAGE GLUCOSE BLD GHB EST-MCNC: 111 MG/DL
GLOBULIN SER CALC-MCNC: 2.6 G/DL (ref 1.5–4.5)
GLUCOSE SERPL-MCNC: 103 MG/DL (ref 65–99)
HBA1C MFR BLD: 5.5 % (ref 4.8–5.6)
HCT VFR BLD AUTO: 41.8 % (ref 37.5–51)
HCV AB S/CO SERPL IA: <0.1 S/CO RATIO (ref 0–0.9)
HDLC SERPL-MCNC: 53 MG/DL
HGB BLD-MCNC: 13.8 G/DL (ref 12.6–17.7)
IMM GRANULOCYTES # BLD: 0 X10E3/UL (ref 0–0.1)
IMM GRANULOCYTES NFR BLD: 0 %
INTERPRETATION, 910389: NORMAL
LDLC SERPL CALC-MCNC: 100 MG/DL (ref 0–99)
LYMPHOCYTES # BLD AUTO: 2.7 X10E3/UL (ref 0.7–3.1)
LYMPHOCYTES NFR BLD AUTO: 39 %
Lab: NORMAL
MCH RBC QN AUTO: 30.9 PG (ref 26.6–33)
MCHC RBC AUTO-ENTMCNC: 33 G/DL (ref 31.5–35.7)
MCV RBC AUTO: 94 FL (ref 79–97)
MICROALBUMIN UR-MCNC: <3 UG/ML
MONOCYTES # BLD AUTO: 0.5 X10E3/UL (ref 0.1–0.9)
MONOCYTES NFR BLD AUTO: 6 %
NEUTROPHILS # BLD AUTO: 3.4 X10E3/UL (ref 1.4–7)
NEUTROPHILS NFR BLD AUTO: 49 %
PLATELET # BLD AUTO: 195 X10E3/UL (ref 150–379)
POTASSIUM SERPL-SCNC: 4.2 MMOL/L (ref 3.5–5.2)
PROT SERPL-MCNC: 6.5 G/DL (ref 6–8.5)
PSA SERPL-MCNC: 2.6 NG/ML (ref 0–4)
RBC # BLD AUTO: 4.46 X10E6/UL (ref 4.14–5.8)
SODIUM SERPL-SCNC: 143 MMOL/L (ref 134–144)
TRIGL SERPL-MCNC: 50 MG/DL (ref 0–149)
TSH SERPL DL<=0.005 MIU/L-ACNC: 1.02 UIU/ML (ref 0.45–4.5)
VLDLC SERPL CALC-MCNC: 10 MG/DL (ref 5–40)
WBC # BLD AUTO: 7 X10E3/UL (ref 3.4–10.8)

## 2018-02-12 ENCOUNTER — OFFICE VISIT (OUTPATIENT)
Dept: FAMILY MEDICINE CLINIC | Age: 68
End: 2018-02-12

## 2018-02-12 VITALS
TEMPERATURE: 98.1 F | RESPIRATION RATE: 18 BRPM | WEIGHT: 141 LBS | DIASTOLIC BLOOD PRESSURE: 84 MMHG | OXYGEN SATURATION: 98 % | HEIGHT: 69 IN | SYSTOLIC BLOOD PRESSURE: 144 MMHG | BODY MASS INDEX: 20.88 KG/M2 | HEART RATE: 76 BPM

## 2018-02-12 DIAGNOSIS — R09.89 RIGHT CAROTID BRUIT: ICD-10-CM

## 2018-02-12 DIAGNOSIS — E78.00 HYPERCHOLESTEROLEMIA: ICD-10-CM

## 2018-02-12 DIAGNOSIS — E11.9 TYPE 2 DIABETES MELLITUS WITHOUT COMPLICATION, WITHOUT LONG-TERM CURRENT USE OF INSULIN (HCC): ICD-10-CM

## 2018-02-12 DIAGNOSIS — I10 ESSENTIAL HYPERTENSION: ICD-10-CM

## 2018-02-12 DIAGNOSIS — Z13.29 SCREENING FOR THYROID DISORDER: ICD-10-CM

## 2018-02-12 DIAGNOSIS — R42 DIZZINESS: Primary | ICD-10-CM

## 2018-02-12 DIAGNOSIS — I25.2 HISTORY OF MI (MYOCARDIAL INFARCTION): ICD-10-CM

## 2018-02-12 LAB — GLUCOSE POC: 113 MG/DL

## 2018-02-12 RX ORDER — HYDROCODONE BITARTRATE AND ACETAMINOPHEN 7.5; 325 MG/1; MG/1
TABLET ORAL
Refills: 0 | COMMUNITY
Start: 2018-02-07 | End: 2018-06-08

## 2018-02-12 RX ORDER — MECLIZINE HYDROCHLORIDE 25 MG/1
25 TABLET ORAL
Qty: 30 TAB | Refills: 1 | Status: SHIPPED | OUTPATIENT
Start: 2018-02-12 | End: 2018-05-04 | Stop reason: SDUPTHER

## 2018-02-12 RX ORDER — IBUPROFEN 600 MG/1
TABLET ORAL
Refills: 0 | COMMUNITY
Start: 2018-02-07 | End: 2018-10-02 | Stop reason: ALTCHOICE

## 2018-02-12 RX ORDER — PRASUGREL 10 MG/1
TABLET, FILM COATED ORAL
Refills: 1 | COMMUNITY
Start: 2018-01-21

## 2018-02-12 RX ORDER — NITROGLYCERIN 0.4 MG/1
TABLET SUBLINGUAL
Refills: 0 | COMMUNITY
Start: 2018-01-19

## 2018-02-12 NOTE — PROGRESS NOTES
Chief Complaint   Patient presents with    Dizziness     Patient present during walk in clinic with sx that began one week ago. Pt states dizziness is noted when sitting and walking, not noticed while laying down. Pt states only med change is blood thinner was initiated one month ago. Pt states he was seen in [facility] Ed for dizziness was advised dehydration. Fluids was given, pt states he noticed temporary relief. Pt states he has treated dizziness with Mucinex and used wax removal kit, with no relief noted. 1. Have you been to the ER, urgent care clinic since your last visit? Hospitalized since your last visit? No    2. Have you seen or consulted any other health care providers outside of the [address] [place] [address] since your last visit? Include any pap smears or colon screening.  No

## 2018-02-12 NOTE — PROGRESS NOTES
Chief Complaint   Patient presents with    Dizziness     Patient present during walk in clinic with sx that began one week ago. Pt states dizziness is noted when sitting and walking, not noticed while laying down. Pt states only med change is blood thinner was initiated one month ago. Pt states he was seen in [facility] Ed for dizziness was advised dehydration. Fluids given, pt states he noticed temporary relief. Pt states he has treated dizziness with Mucinex and used wax removal kit, with no relief noted. Pt was seen at [facility] for MI on January 19th. Pt has a history of multiple MIs and multiple stent placements. Received 2 stents. Was kept in the hospital for a few days. Was started on Effiant. Being managed by Dr. Sallie Zuleta. Has an appt with him later today. Things seemed to be fine until he got home that Sunday. Noticed some dizziness that day he was discharged. Monday morning dizziness was more severe. Went back to the ED. Received IV fluids which helped for 1-2 days. Now states the only time he is dizzy is when he is laying down. Denies receiving any imaging of the head. Had an EKG and lab work. Chest xray was normal.     Denies any associated cp, sob, and dyspnea. Denies any heart palpitations or skipping beats/racing. States things look dizzy or foggy. Denies any history of stroke. Denies any history of brain bleed. Denies any associated weakness on one side, slurred speech, facial drooping. Dizziness can be worse with position changes. Equilibrium feels off. Takes a while to get back to normal.     Denies any other concerns at this time. Chief Complaint   Patient presents with    Dizziness     he is a 74y.o. year old male who presents for evalution. Reviewed PmHx, RxHx, FmHx, SocHx, AllgHx and updated and dated in the chart.     Review of Systems - negative except as listed above in the HPI    Objective:     Vitals:    02/12/18 0823   BP: 144/84 Pulse: 76   Resp: 18   Temp: 98.1 °F (36.7 °C)   TempSrc: Oral   SpO2: 98%   Weight: 141 lb (64 kg)   Height: 5' 9\" (1.753 m)     Physical Examination: General appearance - alert, well appearing, and in no distress  Mental status - alert, oriented to person, place, and time, normal mood, behavior, speech, dress, motor activity, and thought processes  Eyes - pupils equal and reactive, extraocular eye movements intact  Ears - bilateral TM's and external ear canals normal  Nose - normal and patent, no erythema, discharge or polyps and normal nontender sinuses  Mouth - mucous membranes moist, pharynx normal without lesions  Neck - supple, no significant adenopathy, carotid bruit noted right, thyroid exam: thyroid is normal in size without nodules or tenderness  Chest - clear to auscultation, no wheezes, rales or rhonchi, symmetric air entry  Heart - normal rate, regular rhythm, normal S1, S2, no murmurs  Neurological - DTR's normal and symmetric, motor and sensory grossly normal bilaterally, normal muscle tone, no tremors, strength 5/5; reports a dizziness with looking up or looking to the left; describes as things looking fuzzy  Extremities - peripheral pulses normal, no ankle edema, no clubbing or cyanosis  Skin - normal coloration and turgor, no rashes, no suspicious skin lesions noted    Assessment/ Plan:   Diagnoses and all orders for this visit:    1. Dizziness  -     CT HEAD WO CONT; Future  -     DUPLEX CAROTID BILATERAL; Future  -     AMB POC GLUCOSE BLOOD, BY GLUCOSE MONITORING DEVICE  -     NT-PRO BNP  Recommended CT asap. Will notify results and deviate plan based on findings. Reviewed acute/worsening s/sx that warrant more immediate medical attention and pt verbalized understanding of this. Trial of meclizine for sx since they are worse with position changes. Normal glucose. Enc pt to push fluids and be slow/purposeful with position changes.    2. History of MI (myocardial infarction)  Enc pt to keep his appt with cardiology later today for ongoing management. 3. Type 2 diabetes mellitus without complication, without long-term current use of insulin (HCC)  -     METABOLIC PANEL, COMPREHENSIVE  -     CBC WITH AUTOMATED DIFF  -     HEMOGLOBIN A1C WITH EAG  Will notify results and deviate plan based on findings. Continue medications as prescribed. Continue to monitor glucose closely. 4. Right carotid bruit  -     DUPLEX CAROTID BILATERAL; Future  Will notify results and deviate plan based on findings. 5. Essential hypertension  BP a little elevated. 6. Hypercholesterolemia  -     LIPID PANEL  Will notify results and deviate plan based on findings. Continue to follow up with cardiology as advised. LDL goal 70 or less due to history. 7. Screening for thyroid disorder  -     TSH 3RD GENERATION  Screening. Follow-up Disposition:  Return if symptoms worsen or fail to improve. I have discussed the diagnosis with the patient and the intended plan as seen in the above orders. The patient has received an after-visit summary and questions were answered concerning future plans. Medication Side Effects and Warnings were discussed with patient: yes  Patient Labs were reviewed and or requested: yes  Patient Past Records were reviewed and or requested  yes  Patient / Caregiver Understanding of treatment plan was verbalized during office visit YES    AMELIE Louis    There are no Patient Instructions on file for this visit.

## 2018-02-12 NOTE — MR AVS SNAPSHOT
[address] [address] 
[phone] Patient: José Luis Hua MRN: [id number] FEA:[id number][id number] Visit Information Date & Time Provider Department Dept. Phone Encounter #  
 2/12/2018  7:45 AM Steffanie Norton NP [facility] [phone] [id number] Follow-up Instructions Return if symptoms worsen or fail to improve. Upcoming Health Maintenance Date Due DTaP/Tdap/Td series (1 - Tdap) 8/20/1973 GLAUCOMA SCREENING Q2Y 8/20/2017 COLONOSCOPY 8/5/2018 HEMOGLOBIN A1C Q6M 3/26/2018 LIPID PANEL Q1 9/26/2018 Pneumococcal 65+ Low/Medium Risk (2 of 2 - PPSV23) 9/27/2018 MEDICARE YEARLY EXAM 9/27/2018 Allergies as of 2/12/2018  Review Complete On: 2/12/2018 By: Steffanie Norton NP Severity Noted Reaction Type Reactions Pcn [Penicillins]  05/25/2010    Other (comments) Lock face Sulfa (Sulfonamide Antibiotics)  05/25/2010    Rash Current Immunizations  Reviewed on 9/29/2017 Name Date Influenza High Dose Vaccine PF 9/26/2017 Influenza Vaccine 10/14/2016, 9/1/2015 Influenza Vaccine Split 10/9/2012, 11/4/2011, 11/18/2010 Pneumococcal Conjugate (PCV-13) 9/27/2017 Zoster Vaccine, Live 9/6/2017 Not reviewed this visit You Were Diagnosed With   
  
 Codes Comments Dizziness    -  Primary ICD-10-CM: F11 ICD-9-CM: 780.4 History of MI (myocardial infarction)     ICD-10-CM: I25.2 ICD-9-CM: 122 Type 2 diabetes mellitus without complication, without long-term current use of insulin (HCC)     ICD-10-CM: E11.9 ICD-9-CM: 250.00 Right carotid bruit     ICD-10-CM: R09.89 ICD-9-CM: 785.9 Essential hypertension     ICD-10-CM: I10 
ICD-9-CM: 401.9 Hypercholesterolemia     ICD-10-CM: E78.00 ICD-9-CM: 272.0 Screening for thyroid disorder     ICD-10-CM: Z13.29 ICD-9-CM: V77.0 Vitals BP Pulse Temp Resp Height(growth percentile) Weight(growth percentile) 144/84 (BP 1 Location: Right arm, BP Patient Position: Sitting) 76 98.1 °F (36.7 °C) (Oral) 18 5' 9\" (1.753 m) 141 lb (64 kg) SpO2 BMI Smoking Status 98% 20.82 kg/m2 Former Smoker Vitals History BMI and BSA Data Body Mass Index Body Surface Area  
 20.82 kg/m 2 1.77 m 2 Preferred Pharmacy Pharmacy Name Phone [facility] [address] [phone] Your Updated Medication List  
  
   
This list is accurate as of: 2/12/18  8:52 AM.  Always use your most recent med list.  
  
  
  
  
 * albuterol 90 mcg/actuation inhaler Commonly known as:  PROVENTIL HFA, VENTOLIN HFA, PROAIR HFA Take 2 Puffs by inhalation every four (4) hours as needed for Wheezing or Shortness of Breath. * albuterol 2.5 mg /3 mL (0.083 %) nebulizer solution Commonly known as:  PROVENTIL VENTOLIN  
3 mL by Nebulization route every four (4) hours as needed for Wheezing. amLODIPine 10 mg tablet Commonly known as:  Janie Ashland TAKE 1 TABLET EVERY DAY  
  
 aspirin 81 mg tablet Take 81 mg by mouth daily. atorvastatin 20 mg tablet Commonly known as:  LIPITOR  
TAKE 1 TABLET EVERY DAY  
  
 CENTRUM SILVER Tab tablet Generic drug:  multivitamins-minerals-lutein Take  by mouth.  
  
 cloNIDine HCl 0.2 mg tablet Commonly known as:  CATAPRES Take 1 Tab by mouth two (2) times a day. fluticasone 44 mcg/actuation inhaler Commonly known as:  FLOVENT HFA Take 2 Puffs by inhalation two (2) times a day. glucose blood VI test strips strip Commonly known as:  ONETOUCH ULTRA TEST  
by Does Not Apply route. Needs strips for one touch ultra 2 Once a day testing  
  
 hydroCHLOROthiazide 25 mg tablet Commonly known as:  HYDRODIURIL  
TAKE 1 TABLET ONE TIME DAILY HYDROcodone-acetaminophen 7.5-325 mg per tablet Commonly known as:  Drake Isabel  
 TAKE 1 TABLET BY MOUTH EVERY 4 HOURS AS NEEDED FOR PAIN  
  
 ibuprofen 600 mg tablet Commonly known as:  MOTRIN  
FOLLOWING SURGERY, TAKE 1 TABLET BY MOUTH 4 TIMES A DAY UNTIL GONE Lancets Misc Commonly known as:  ONETOUCH ULTRASOFT LANCETS  
by Does Not Apply route. Checks bg daily  
  
 lisinopril 20 mg tablet Commonly known as:  PRINIVIL, ZESTRIL  
TAKE 1 TABLET EVERY DAY  
  
 loratadine 10 mg tablet Commonly known as:  CLARITIN  
take 1 tablet by mouth once daily  
  
 metFORMIN  mg tablet Commonly known as:  GLUCOPHAGE XR  
TAKE 1 TABLET DAILY (WITH DINNER). metoprolol succinate 100 mg tablet Commonly known as:  TOPROL-XL  
TAKE 1 TABLET EVERY DAY  
  
 montelukast 10 mg tablet Commonly known as:  SINGULAIR Take 1 Tab by mouth daily. Take 1 po qd  
  
 nitroglycerin 0.4 mg SL tablet Commonly known as:  NITROSTAT PLACE 1 TABLET under the tongue as directed if needed for CHEST PAIN  
  
 prasugrel 10 mg tablet Commonly known as:  EFFIENT  
take 1 tablet by mouth once daily  
  
 tamsulosin 0.4 mg capsule Commonly known as:  FLOMAX Take 1 Cap by mouth daily. * Notice: This list has 2 medication(s) that are the same as other medications prescribed for you. Read the directions carefully, and ask your doctor or other care provider to review them with you. We Performed the Following AMB POC GLUCOSE BLOOD, BY GLUCOSE MONITORING DEVICE [96960 CPT(R)] CBC WITH AUTOMATED DIFF [91255 CPT(R)] HEMOGLOBIN A1C WITH EAG [54508 CPT(R)] LIPID PANEL [93834 CPT(R)] METABOLIC PANEL, COMPREHENSIVE [88370 CPT(R)] NT-PRO BNP [id number] CPT(R)] TSH 3RD GENERATION [88445 CPT(R)] Follow-up Instructions Return if symptoms worsen or fail to improve. To-Do List   
 02/12/2018 Imaging:  CT HEAD WO CONT   
  
 02/12/2018 Imaging:  DUPLEX CAROTID BILATERAL Introducing [facility]! Beth Larios introduces [employer] patient portal. Now you can access parts of your medical record, email your doctor's office, and request medication refills online. 1. In your internet browser, go to [email or web address] 2. Click on the First Time User? Click Here link in the Sign In box. You will see the New Member Sign Up page. 3. Enter your [employer] Access Code exactly as it appears below. You will not need to use this code after youve completed the sign-up process. If you do not sign up before the expiration date, you must request a new code. · [employer] Access Code: [email or web address] Expires: 5/13/2018  8:17 AM 
 
4. Enter the last four digits of your Social Security Number (xxxx) and Date of Birth (mm/dd/yyyy) as indicated and click Submit. You will be taken to the next sign-up page. 5. Create a [employer] ID. This will be your [employer] login ID and cannot be changed, so think of one that is secure and easy to remember. 6. Create a [employer] password. You can change your password at any time. 7. Enter your Password Reset Question and Answer. This can be used at a later time if you forget your password. 8. Enter your e-mail address. You will receive e-mail notification when new information is available in [address] 9. Click Sign Up. You can now view and download portions of your medical record. 10. Click the Download Summary menu link to download a portable copy of your medical information. If you have questions, please visit the Frequently Asked Questions section of the [employer] website. Remember, [employer] is NOT to be used for urgent needs. For medical emergencies, dial 911. Now available from your iPhone and Android! Please provide this summary of care documentation to your next provider. Your primary care clinician is listed as Cecilio Saul. If you have any questions after today's visit, please call [phone].

## 2018-02-13 LAB
ALBUMIN SERPL-MCNC: 4.1 G/DL (ref 3.6–4.8)
ALBUMIN/GLOB SERPL: 1.5 {RATIO} (ref 1.2–2.2)
ALP SERPL-CCNC: 70 IU/L (ref 39–117)
ALT SERPL-CCNC: 10 IU/L (ref 0–44)
AST SERPL-CCNC: 13 IU/L (ref 0–40)
BASOPHILS # BLD AUTO: 0 X10E3/UL (ref 0–0.2)
BASOPHILS NFR BLD AUTO: 0 %
BILIRUB SERPL-MCNC: 0.4 MG/DL (ref 0–1.2)
BUN SERPL-MCNC: 7 MG/DL (ref 8–27)
BUN/CREAT SERPL: 8 (ref 10–24)
CALCIUM SERPL-MCNC: 9.4 MG/DL (ref 8.6–10.2)
CHLORIDE SERPL-SCNC: 99 MMOL/L (ref 96–106)
CHOLEST SERPL-MCNC: 168 MG/DL (ref 100–199)
CO2 SERPL-SCNC: 27 MMOL/L (ref 18–29)
CREAT SERPL-MCNC: 0.84 MG/DL (ref 0.76–1.27)
EOSINOPHIL # BLD AUTO: 0.6 X10E3/UL (ref 0–0.4)
EOSINOPHIL NFR BLD AUTO: 7 %
ERYTHROCYTE [DISTWIDTH] IN BLOOD BY AUTOMATED COUNT: 14.8 % (ref 12.3–15.4)
EST. AVERAGE GLUCOSE BLD GHB EST-MCNC: 114 MG/DL
GFR SERPLBLD CREATININE-BSD FMLA CKD-EPI: 106 ML/MIN/1.73
GFR SERPLBLD CREATININE-BSD FMLA CKD-EPI: 92 ML/MIN/1.73
GLOBULIN SER CALC-MCNC: 2.7 G/DL (ref 1.5–4.5)
GLUCOSE SERPL-MCNC: 102 MG/DL (ref 65–99)
HBA1C MFR BLD: 5.6 % (ref 4.8–5.6)
HCT VFR BLD AUTO: 41 % (ref 37.5–51)
HDLC SERPL-MCNC: 45 MG/DL
HGB BLD-MCNC: 13.6 G/DL (ref 13–17.7)
IMM GRANULOCYTES # BLD: 0 X10E3/UL (ref 0–0.1)
IMM GRANULOCYTES NFR BLD: 0 %
INTERPRETATION, 910389: NORMAL
LDLC SERPL CALC-MCNC: 110 MG/DL (ref 0–99)
LYMPHOCYTES # BLD AUTO: 3.7 X10E3/UL (ref 0.7–3.1)
LYMPHOCYTES NFR BLD AUTO: 45 %
Lab: NORMAL
MCH RBC QN AUTO: 30.6 PG (ref 26.6–33)
MCHC RBC AUTO-ENTMCNC: 33.2 G/DL (ref 31.5–35.7)
MCV RBC AUTO: 92 FL (ref 79–97)
MONOCYTES # BLD AUTO: 0.8 X10E3/UL (ref 0.1–0.9)
MONOCYTES NFR BLD AUTO: 10 %
NEUTROPHILS # BLD AUTO: 3.1 X10E3/UL (ref 1.4–7)
NEUTROPHILS NFR BLD AUTO: 38 %
NT-PROBNP SERPL-MCNC: 868 PG/ML (ref 0–376)
PLATELET # BLD AUTO: 217 X10E3/UL (ref 150–379)
POTASSIUM SERPL-SCNC: 3.7 MMOL/L (ref 3.5–5.2)
PROT SERPL-MCNC: 6.8 G/DL (ref 6–8.5)
RBC # BLD AUTO: 4.44 X10E6/UL (ref 4.14–5.8)
SODIUM SERPL-SCNC: 141 MMOL/L (ref 134–144)
TRIGL SERPL-MCNC: 65 MG/DL (ref 0–149)
TSH SERPL DL<=0.005 MIU/L-ACNC: 1.95 UIU/ML (ref 0.45–4.5)
VLDLC SERPL CALC-MCNC: 13 MG/DL (ref 5–40)
WBC # BLD AUTO: 8.2 X10E3/UL (ref 3.4–10.8)

## 2018-02-13 RX ORDER — ATORVASTATIN CALCIUM 40 MG/1
40 TABLET, FILM COATED ORAL DAILY
Qty: 90 TAB | Refills: 3 | Status: SHIPPED | OUTPATIENT
Start: 2018-02-13 | End: 2018-06-08 | Stop reason: ALTCHOICE

## 2018-02-13 NOTE — PROGRESS NOTES
I recommend increasing Lipitor to 40 mg daily. He can double up on the pills he has left. New rx sent to [facility] on file.

## 2018-02-13 NOTE — PROGRESS NOTES
Please notify pt the followin. Diabetes is very well controlled. Enc pt to keep up the good work with diet and continue medications daily as prescribed. 2. Cholesterol is up. I will fax these results to his cardiologist for review. May want to consider increasing his Lipitor or changing to a stronger statin called Crestor. If he is amenable and would like me to adjust this med let me know. 3. Normal thyroid function. 4. Blood test for heart failure as the cause of his dizziness is not sig elevated. Likely not the cause of dizziness. Enc pt to continue with plan to complete CT and carotid dopplers. Has he picked up and started meclizine yet? Any improvement in his sx? All other labs are normal. Reinforce with patient that if dizziness worsens or if something just doesn't feel right that he should return to ED for further evaluation. Let me know what he says about lipids and if he has any other questions.

## 2018-02-16 ENCOUNTER — HOSPITAL ENCOUNTER (OUTPATIENT)
Dept: VASCULAR SURGERY | Age: 68
Discharge: HOME | End: 2018-02-16
Payer: MEDICARE

## 2018-02-16 ENCOUNTER — HOSPITAL ENCOUNTER (OUTPATIENT)
Dept: CT IMAGING | Age: 68
Discharge: HOME | End: 2018-02-16
Payer: MEDICARE

## 2018-02-16 DIAGNOSIS — R09.89 RIGHT CAROTID BRUIT: ICD-10-CM

## 2018-02-16 DIAGNOSIS — R42 DIZZINESS: ICD-10-CM

## 2018-02-16 DIAGNOSIS — J32.0 CHRONIC MAXILLARY SINUSITIS: Primary | ICD-10-CM

## 2018-02-16 PROCEDURE — 93880 EXTRACRANIAL BILAT STUDY: CPT

## 2018-02-16 PROCEDURE — 70450 CT HEAD/BRAIN W/O DYE: CPT

## 2018-02-16 RX ORDER — FLUTICASONE PROPIONATE 50 MCG
2 SPRAY, SUSPENSION (ML) NASAL DAILY
Qty: 1 BOTTLE | Refills: 2 | Status: SHIPPED | OUTPATIENT
Start: 2018-02-16 | End: 2018-10-02 | Stop reason: ALTCHOICE

## 2018-02-16 NOTE — PROCEDURES
[address] 89  *** FINAL REPORT ***    Name: Trey Vera  MRN: [id number]    Outpatient  : 20 Aug 1950  HIS Order #: [id number]  [facility] Visit #: [id number]  Date: 2018    TYPE OF TEST: Cerebrovascular Duplex    REASON FOR TEST  Dizziness/vertigo    Right Carotid:-             Proximal               Mid                 Distal  cm/s  Systolic  Diastolic  Systolic  Diastolic  Systolic  Diastolic  CCA:     39.8      12.0                            76.7      18.5  Bulb:  ECA:    212.4      14.9  ICA:    131.5      24.6      103.6      31.6       47.2      17.0  ICA/CCA:  1.7       1.3    ICA Stenosis: <50%    Right Vertebral:-  Finding: Antegrade  Sys:       43.5  Bailey:       16.3    Right Subclavian:    Left Carotid:-            Proximal                Mid                 Distal  cm/s  Systolic  Diastolic  Systolic  Diastolic  Systolic  Diastolic  CCA:     96.8      22.8                            81.0      21.5  Bulb:  ECA:     21.8       0.0  ICA:    112.8      28.1      108.8      30.1       95.1      38.0  ICA/CCA:  1.4       1.3    ICA Stenosis: <50%    Left Vertebral:-  Finding: Antegrade  Sys:       21.8  Bailey:        0.0    Left Subclavian:    INTERPRETATION/FINDINGS  PROCEDURE:  Evaluation of the extracranial cerebrovascular arteries  with ultrasound (B-mode imaging, pulsed Doppler, color Doppler). Includes the common carotid, internal carotid, external carotid, and  vertebral arteries. FINDINGS: Bilateral intimal thickening noted within the common carotid   arteries. Calcific plaque identified within the bulb, proximal  external carotid and  proximal internal carotid arteries bilaterally. IMPRESSION: Findings are consistent with 0-49% stenosis of the right  internal carotid and 0-49% stenosis of the left internal carotid. Vertebrals are patent with antegrade flow.     ADDITIONAL COMMENTS    I have personally reviewed the data relevant to the interpretation of  this  study. TECHNOLOGIST: Renu Madrid RVT  Signed: 2/16/2018 8:08:59 AM    PHYSICIAN: Pennie Snellen.  Gage Milan MD  Signed: 2/16/2018 9:34:13 AM

## 2018-02-16 NOTE — PROGRESS NOTES
Please notify pt the following:  CT shows no evidence of any bleeding, mass, or other acute abnormality which is great! Only abnormal finding is that there are some bony changes present in the maxillary sinuses (sinuses in the cheeks) that are consistent with chronic sinus inflammation. This could actually be contributing to his dizziness. So I recommend he start a daily nasal steroid called flonase. He will administer 2 sprays into each nostril once daily. If his sx of dizziness persist or worsen, I can refer him to ENT for further evaluation.

## 2018-02-16 NOTE — PROGRESS NOTES
Please notify pt that carotid duplex shows mild plaque build up in bilateral carotid arteries. Enc to continue taking lipitor and effient as prescribed. No further treatment indicated at this time.

## 2018-05-04 DIAGNOSIS — R42 DIZZINESS: ICD-10-CM

## 2018-05-04 RX ORDER — MECLIZINE HYDROCHLORIDE 25 MG/1
TABLET ORAL
Qty: 30 TAB | Refills: 1 | Status: SHIPPED | OUTPATIENT
Start: 2018-05-04 | End: 2018-06-08 | Stop reason: SDUPTHER

## 2018-06-08 ENCOUNTER — OFFICE VISIT (OUTPATIENT)
Dept: FAMILY MEDICINE CLINIC | Age: 68
End: 2018-06-08

## 2018-06-08 VITALS
SYSTOLIC BLOOD PRESSURE: 157 MMHG | HEIGHT: 69 IN | RESPIRATION RATE: 16 BRPM | WEIGHT: 141 LBS | HEART RATE: 70 BPM | DIASTOLIC BLOOD PRESSURE: 86 MMHG | OXYGEN SATURATION: 99 % | BODY MASS INDEX: 20.88 KG/M2 | TEMPERATURE: 98.1 F

## 2018-06-08 DIAGNOSIS — R42 DIZZINESS: ICD-10-CM

## 2018-06-08 DIAGNOSIS — J01.00 ACUTE NON-RECURRENT MAXILLARY SINUSITIS: Primary | ICD-10-CM

## 2018-06-08 DIAGNOSIS — Z91.89 POOR DENTAL HYGIENE: ICD-10-CM

## 2018-06-08 DIAGNOSIS — I10 ESSENTIAL HYPERTENSION: ICD-10-CM

## 2018-06-08 RX ORDER — CEFDINIR 300 MG/1
300 CAPSULE ORAL 2 TIMES DAILY
Qty: 20 CAP | Refills: 0 | Status: SHIPPED | OUTPATIENT
Start: 2018-06-08 | End: 2018-06-18

## 2018-06-08 RX ORDER — MECLIZINE HYDROCHLORIDE 25 MG/1
TABLET ORAL
Qty: 30 TAB | Refills: 1 | Status: SHIPPED | OUTPATIENT
Start: 2018-06-08 | End: 2018-10-02 | Stop reason: ALTCHOICE

## 2018-06-08 RX ORDER — ATORVASTATIN CALCIUM 20 MG/1
TABLET, FILM COATED ORAL
COMMUNITY
Start: 2018-04-04 | End: 2018-08-27 | Stop reason: SDUPTHER

## 2018-06-08 RX ORDER — CHLORHEXIDINE GLUCONATE 1.2 MG/ML
15 RINSE ORAL 2 TIMES DAILY
Qty: 420 ML | Refills: 2 | Status: SHIPPED | OUTPATIENT
Start: 2018-06-08 | End: 2018-06-22

## 2018-06-08 NOTE — MR AVS SNAPSHOT
[address] [address] 
[phone] Patient: Pedro Paige MRN: [id number] BDT:[id number] Visit Information Date & Time Provider Department Dept. Phone Encounter #  
 6/8/2018  3:30 PM Carito Segura, NP [address] [facility] [phone] [id number] Follow-up Instructions Return if symptoms worsen or fail to improve. Upcoming Health Maintenance Date Due DTaP/Tdap/Td series (1 - Tdap) 8/20/1973 GLAUCOMA SCREENING Q2Y 8/20/2017 COLONOSCOPY 8/5/2018 Influenza Age 5 to Adult 8/1/2018 HEMOGLOBIN A1C Q6M 8/12/2018 Pneumococcal 65+ Low/Medium Risk (2 of 2 - PPSV23) 9/27/2018 MEDICARE YEARLY EXAM 9/27/2018 LIPID PANEL Q1 2/12/2019 Allergies as of 6/8/2018  Review Complete On: 6/8/2018 By: Nawaf Velez LPN Severity Noted Reaction Type Reactions Pcn [Penicillins]  05/25/2010    Other (comments) Lock face Sulfa (Sulfonamide Antibiotics)  05/25/2010    Rash Current Immunizations  Reviewed on 9/29/2017 Name Date Influenza High Dose Vaccine PF 9/26/2017 Influenza Vaccine 10/14/2016, 9/1/2015 Influenza Vaccine Split 10/9/2012, 11/4/2011, 11/18/2010 Pneumococcal Conjugate (PCV-13) 9/27/2017 Zoster Vaccine, Live 9/6/2017 Not reviewed this visit You Were Diagnosed With   
  
 Codes Comments Acute non-recurrent maxillary sinusitis    -  Primary ICD-10-CM: J01.00 ICD-9-CM: 461.0 Essential hypertension     ICD-10-CM: I10 
ICD-9-CM: 401.9 Dizziness     ICD-10-CM: L76 ICD-9-CM: 780.4 Vitals BP Pulse Temp Resp Height(growth percentile) Weight(growth percentile) 157/86 70 98.1 °F (36.7 °C) (Oral) 16 5' 9\" (1.753 m) 141 lb (64 kg) SpO2 BMI Smoking Status 99% 20.82 kg/m2 Former Smoker Vitals History BMI and BSA Data Body Mass Index Body Surface Area  
 20.82 kg/m 2 1.77 m 2 Preferred Pharmacy [facility] Name Phone [facility] [address] [phone] Your Updated Medication List  
  
   
This list is accurate as of 6/8/18  3:36 PM.  Always use your most recent med list.  
  
  
  
  
 * albuterol 90 mcg/actuation inhaler Commonly known as:  PROVENTIL HFA, VENTOLIN HFA, PROAIR HFA Take 2 Puffs by inhalation every four (4) hours as needed for Wheezing or Shortness of Breath. * albuterol 2.5 mg /3 mL (0.083 %) nebulizer solution Commonly known as:  PROVENTIL VENTOLIN  
3 mL by Nebulization route every four (4) hours as needed for Wheezing. amLODIPine 10 mg tablet Commonly known as:  Adhikari Inks TAKE 1 TABLET EVERY DAY  
  
 aspirin 81 mg tablet Take 81 mg by mouth daily. atorvastatin 40 mg tablet Commonly known as:  LIPITOR Take 1 Tab by mouth daily. cefdinir 300 mg capsule Commonly known as:  OMNICEF Take 1 Cap by mouth two (2) times a day for 10 days. CENTRUM SILVER Tab tablet Generic drug:  multivitamins-minerals-lutein Take  by mouth. chlorhexidine 0.12 % solution Commonly known as:  PERIDEX 15 mL by Swish and Spit route two (2) times a day for 14 days. cloNIDine HCl 0.2 mg tablet Commonly known as:  CATAPRES Take 1 Tab by mouth two (2) times a day. * fluticasone 44 mcg/actuation inhaler Commonly known as:  FLOVENT HFA Take 2 Puffs by inhalation two (2) times a day. * fluticasone 50 mcg/actuation nasal spray Commonly known as:  Rayshawn Askew 2 Sprays by Both Nostrils route daily. glucose blood VI test strips strip Commonly known as:  ONETOUCH ULTRA TEST  
by Does Not Apply route. Needs strips for one touch ultra 2 Once a day testing  
  
 hydroCHLOROthiazide 25 mg tablet Commonly known as:  HYDRODIURIL  
TAKE 1 TABLET ONE TIME DAILY HYDROcodone-acetaminophen 7.5-325 mg per tablet Commonly known as:  Lisa Fritz  
 TAKE 1 TABLET BY MOUTH EVERY 4 HOURS AS NEEDED FOR PAIN  
  
 ibuprofen 600 mg tablet Commonly known as:  MOTRIN  
FOLLOWING SURGERY, TAKE 1 TABLET BY MOUTH 4 TIMES A DAY UNTIL GONE Lancets Misc Commonly known as:  ONETOUCH ULTRASOFT LANCETS  
by Does Not Apply route. Checks bg daily  
  
 lisinopril 20 mg tablet Commonly known as:  PRINIVIL, ZESTRIL  
TAKE 1 TABLET EVERY DAY  
  
 loratadine 10 mg tablet Commonly known as:  CLARITIN  
take 1 tablet by mouth once daily  
  
 meclizine 25 mg tablet Commonly known as:  ANTIVERT  
TAKE 1 TABLET BY MOUTH 3 TIMES A DAY AS NEEDED FOR UP TO 10 DAYS  
  
 metFORMIN  mg tablet Commonly known as:  GLUCOPHAGE XR  
TAKE 1 TABLET DAILY (WITH DINNER). metoprolol succinate 100 mg tablet Commonly known as:  TOPROL-XL  
TAKE 1 TABLET EVERY DAY  
  
 montelukast 10 mg tablet Commonly known as:  SINGULAIR Take 1 Tab by mouth daily. Take 1 po qd  
  
 nitroglycerin 0.4 mg SL tablet Commonly known as:  NITROSTAT PLACE 1 TABLET under the tongue as directed if needed for CHEST PAIN  
  
 prasugrel 10 mg tablet Commonly known as:  EFFIENT  
take 1 tablet by mouth once daily  
  
 tamsulosin 0.4 mg capsule Commonly known as:  FLOMAX Take 1 Cap by mouth daily. * Notice: This list has 4 medication(s) that are the same as other medications prescribed for you. Read the directions carefully, and ask your doctor or other care provider to review them with you. Prescriptions Sent to Pharmacy Refills  
 cefdinir (OMNICEF) 300 mg capsule 0 Sig: Take 1 Cap by mouth two (2) times a day for 10 days. Class: Normal  
 [facility] Ph #: [phone] Route: Oral  
 chlorhexidine (PERIDEX) 0.12 % solution 2 Sig: 15 mL by Swish and Spit route two (2) times a day for 14 days.   
 Class: Normal  
 [facility] Ph #: [phone] Route: Swish and Spit  
 meclizine (ANTIVERT) 25 mg tablet 1 Sig: TAKE 1 TABLET BY MOUTH 3 TIMES A DAY AS NEEDED FOR UP TO 10 DAYS Class: Normal  
 [facility] Ph #: [phone] Follow-up Instructions Return if symptoms worsen or fail to improve. Patient Instructions Sinusitis: Care Instructions Your Care Instructions Sinusitis is an infection of the lining of the sinus cavities in your head. Sinusitis often follows a cold. It causes pain and pressure in your head and face. In most cases, sinusitis gets better on its own in 1 to 2 weeks. But some mild symptoms may last for several weeks. Sometimes antibiotics are needed. Follow-up care is a key part of your treatment and safety. Be sure to make and go to all appointments, and call your doctor if you are having problems. It's also a good idea to know your test results and keep a list of the medicines you take. How can you care for yourself at home? · Take an over-the-counter pain medicine, such as acetaminophen (Tylenol), ibuprofen (Advil, Motrin), or naproxen (Aleve). Read and follow all instructions on the label. · If the doctor prescribed antibiotics, take them as directed. Do not stop taking them just because you feel better. You need to take the full course of antibiotics. · Be careful when taking over-the-counter cold or flu medicines and Tylenol at the same time. Many of these medicines have acetaminophen, which is Tylenol. Read the labels to make sure that you are not taking more than the recommended dose. Too much acetaminophen (Tylenol) can be harmful. · Breathe warm, moist air from a steamy shower, a hot bath, or a sink filled with hot water. Avoid cold, dry air.  Using a humidifier in your home may help. Follow the directions for cleaning the machine. · Use saline (saltwater) nasal washes to help keep your nasal passages open and wash out mucus and bacteria. You can buy saline nose drops at a grocery store or drugstore. Or you can make your own at home by adding 1 teaspoon of salt and 1 teaspoon of baking soda to 2 cups of distilled water. If you make your own, fill a bulb syringe with the solution, insert the tip into your nostril, and squeeze gently. Lunette Parisian your nose. · Put a hot, wet towel or a warm gel pack on your face 3 or 4 times a day for 5 to 10 minutes each time. · Try a decongestant nasal spray like oxymetazoline (Afrin). Do not use it for more than 3 days in a row. Using it for more than 3 days can make your congestion worse. When should you call for help? Call your doctor now or seek immediate medical care if: 
? · You have new or worse swelling or redness in your face or around your eyes. ? · You have a new or higher fever. ? Watch closely for changes in your health, and be sure to contact your doctor if: 
? · You have new or worse facial pain. ? · The mucus from your nose becomes thicker (like pus) or has new blood in it. ? · You are not getting better as expected. Where can you learn more? Go to http://yony-charla.info/. Enter Y068 in the search box to learn more about \"Sinusitis: Care Instructions. \" Current as of: May 12, 2017 Content Version: 11.4 © 6508-6645 "[employer]". Care instructions adapted under license by [email or web address] (which disclaims liability or warranty for this information). If you have questions about a medical condition or this instruction, always ask your healthcare professional. [address] any warranty or liability for your use of this information. Introducing [facility]!    
 [facility] introduces [employer] patient portal. Now you can access parts of your medical record, email your doctor's office, and request medication refills online. 1. In your internet browser, go to [email or web address] 2. Click on the First Time User? Click Here link in the Sign In box. You will see the New Member Sign Up page. 3. Enter your [employer] Access Code exactly as it appears below. You will not need to use this code after youve completed the sign-up process. If you do not sign up before the expiration date, you must request a new code. · [employer] Access Code: [email or web address] Expires: 9/6/2018  3:36 PM 
 
4. Enter the last four digits of your Social Security Number (xxxx) and Date of Birth (mm/dd/yyyy) as indicated and click Submit. You will be taken to the next sign-up page. 5. Create a [employer] ID. This will be your [employer] login ID and cannot be changed, so think of one that is secure and easy to remember. 6. Create a [employer] password. You can change your password at any time. 7. Enter your Password Reset Question and Answer. This can be used at a later time if you forget your password. 8. Enter your e-mail address. You will receive e-mail notification when new information is available in [address] 9. Click Sign Up. You can now view and download portions of your medical record. 10. Click the Download Summary menu link to download a portable copy of your medical information. If you have questions, please visit the Frequently Asked Questions section of the [employer] website. Remember, [employer] is NOT to be used for urgent needs. For medical emergencies, dial 911. Now available from your iPhone and Android! Please provide this summary of care documentation to your next provider. Your primary care clinician is listed as Rebekah Seats. If you have any questions after today's visit, please call [phone].

## 2018-06-08 NOTE — PROGRESS NOTES
1. Have you been to the ER, urgent care clinic since your last visit? Hospitalized since your last visit? No    2. Have you seen or consulted any other health care providers outside of the [facility] since your last visit? Include any pap smears or colon screening.  No     Chief Complaint   Patient presents with    Mouth Pain     x2 days

## 2018-06-08 NOTE — PROGRESS NOTES
Chief Complaint   Patient presents with    Mouth Pain     x2 days     he is a 74y.o. year old male who presents for evalution. Pt states has been having pain in R side of face for past 2 days. Feels like sinus infection. Teeth have been very painful, hurts to eat. Has been taking Tylenol and using heating pad. No fever or chills. Course is worsening. Needs refill on Meclizine, only takes prn. Reviewed PmHx, RxHx, FmHx, SocHx, AllgHx and updated and dated in the chart. Review of Systems - negative except as listed above in the HPI    Objective:     Vitals:    06/08/18 1526   BP: 157/86   Pulse: 70   Resp: 16   Temp: 98.1 °F (36.7 °C)   TempSrc: Oral   SpO2: 99%   Weight: 141 lb (64 kg)   Height: 5' 9\" (1.753 m)     Physical Examination: General appearance - alert, well appearing, and in no distress  Ears - bilateral TM's and external ear canals normal  Nose - mucosal congestion, mucosal erythema and sinus tenderness noted R maxillary   Mouth - mucous membranes moist, pharynx normal without lesions, erythematous and dental hygiene poor  Neck - supple, no significant adenopathy  Chest - clear to auscultation, no wheezes, rales or rhonchi, symmetric air entry  Heart - normal rate, regular rhythm, normal S1, S2, no murmurs, rubs, clicks or gallops    Assessment/ Plan:   Diagnoses and all orders for this visit:    1. Acute non-recurrent maxillary sinusitis  -     cefdinir (OMNICEF) 300 mg capsule; Take 1 Cap by mouth two (2) times a day for 10 days. New rx. Discussed and encouraged rest and clear fluids, if congestion is thick should avoid dairy. Recommended hot showers with steam and elevating head of bed. May use Vitamin C or Echinacea in addition to current therapy. 2. Essential hypertension  Typically well controlled. Elevated today secondary to illness and pain.      3. Dizziness  -     meclizine (ANTIVERT) 25 mg tablet; TAKE 1 TABLET BY MOUTH 3 TIMES A DAY AS NEEDED FOR UP TO 10 DAYS  Stable, refills provided. 4. Poor dental hygiene  -     chlorhexidine (PERIDEX) 0.12 % solution; 15 mL by Swish and Spit route two (2) times a day for 14 days. New rx. Pt voiced understanding regarding plan of care. Follow-up Disposition:  Return if symptoms worsen or fail to improve. I have discussed the diagnosis with the patient and the intended plan as seen in the above orders. The patient has received an after-visit summary and questions were answered concerning future plans.      Medication Side Effects and Warnings were discussed with patient    Pat Eldridge NP

## 2018-06-08 NOTE — PATIENT INSTRUCTIONS
Sinusitis: Care Instructions  Your Care Instructions    Sinusitis is an infection of the lining of the sinus cavities in your head. Sinusitis often follows a cold. It causes pain and pressure in your head and face. In most cases, sinusitis gets better on its own in 1 to 2 weeks. But some mild symptoms may last for several weeks. Sometimes antibiotics are needed. Follow-up care is a key part of your treatment and safety. Be sure to make and go to all appointments, and call your doctor if you are having problems. It's also a good idea to know your test results and keep a list of the medicines you take. How can you care for yourself at home? · Take an over-the-counter pain medicine, such as acetaminophen (Tylenol), ibuprofen (Advil, Motrin), or naproxen (Aleve). Read and follow all instructions on the label. · If the doctor prescribed antibiotics, take them as directed. Do not stop taking them just because you feel better. You need to take the full course of antibiotics. · Be careful when taking over-the-counter cold or flu medicines and Tylenol at the same time. Many of these medicines have acetaminophen, which is Tylenol. Read the labels to make sure that you are not taking more than the recommended dose. Too much acetaminophen (Tylenol) can be harmful. · Breathe warm, moist air from a steamy shower, a hot bath, or a sink filled with hot water. Avoid cold, dry air. Using a humidifier in your home may help. Follow the directions for cleaning the machine. · Use saline (saltwater) nasal washes to help keep your nasal passages open and wash out mucus and bacteria. You can buy saline nose drops at a grocery store or drugstore. Or you can make your own at home by adding 1 teaspoon of salt and 1 teaspoon of baking soda to 2 cups of distilled water. If you make your own, fill a bulb syringe with the solution, insert the tip into your nostril, and squeeze gently. Jamil Jacobo your nose.   · Put a hot, wet towel or a warm gel pack on your face 3 or 4 times a day for 5 to 10 minutes each time. · Try a decongestant nasal spray like oxymetazoline (Afrin). Do not use it for more than 3 days in a row. Using it for more than 3 days can make your congestion worse. When should you call for help? Call your doctor now or seek immediate medical care if:  ? · You have new or worse swelling or redness in your face or around your eyes. ? · You have a new or higher fever. ? Watch closely for changes in your health, and be sure to contact your doctor if:  ? · You have new or worse facial pain. ? · The mucus from your nose becomes thicker (like pus) or has new blood in it. ? · You are not getting better as expected. Where can you learn more? Go to http://yony-charla.info/. Enter Z233 in the search box to learn more about \"Sinusitis: Care Instructions. \"  Current as of: May 12, 2017  Content Version: 11.4  © 7833-4215 [employer]. Care instructions adapted under license by [employer] (which disclaims liability or warranty for this information). If you have questions about a medical condition or this instruction, always ask your healthcare professional. [address] any warranty or liability for your use of this information.

## 2018-08-27 RX ORDER — CLONIDINE HYDROCHLORIDE 0.2 MG/1
TABLET ORAL
Qty: 180 TAB | Refills: 3 | Status: SHIPPED | OUTPATIENT
Start: 2018-08-27

## 2018-08-27 RX ORDER — AMLODIPINE BESYLATE 10 MG/1
TABLET ORAL
Qty: 90 TAB | Refills: 3 | Status: SHIPPED | OUTPATIENT
Start: 2018-08-27

## 2018-08-27 RX ORDER — HYDROCHLOROTHIAZIDE 25 MG/1
TABLET ORAL
Qty: 90 TAB | Refills: 3 | Status: SHIPPED | OUTPATIENT
Start: 2018-08-27

## 2018-08-27 RX ORDER — METOPROLOL SUCCINATE 100 MG/1
TABLET, EXTENDED RELEASE ORAL
Qty: 90 TAB | Refills: 3 | Status: SHIPPED | OUTPATIENT
Start: 2018-08-27

## 2018-08-27 RX ORDER — ATORVASTATIN CALCIUM 20 MG/1
TABLET, FILM COATED ORAL
Qty: 90 TAB | Refills: 3 | Status: SHIPPED | OUTPATIENT
Start: 2018-08-27 | End: 2018-10-02 | Stop reason: SDUPTHER

## 2018-08-27 RX ORDER — LISINOPRIL 20 MG/1
TABLET ORAL
Qty: 90 TAB | Refills: 3 | Status: SHIPPED | OUTPATIENT
Start: 2018-08-27

## 2018-08-27 RX ORDER — METFORMIN HYDROCHLORIDE 500 MG/1
TABLET, EXTENDED RELEASE ORAL
Qty: 90 TAB | Refills: 3 | Status: SHIPPED | OUTPATIENT
Start: 2018-08-27

## 2018-10-02 ENCOUNTER — OFFICE VISIT (OUTPATIENT)
Dept: FAMILY MEDICINE CLINIC | Age: 68
End: 2018-10-02

## 2018-10-02 VITALS
HEIGHT: 69 IN | DIASTOLIC BLOOD PRESSURE: 58 MMHG | WEIGHT: 138 LBS | SYSTOLIC BLOOD PRESSURE: 92 MMHG | OXYGEN SATURATION: 99 % | HEART RATE: 76 BPM | BODY MASS INDEX: 20.44 KG/M2 | TEMPERATURE: 97.6 F | RESPIRATION RATE: 17 BRPM

## 2018-10-02 DIAGNOSIS — I10 ESSENTIAL HYPERTENSION: ICD-10-CM

## 2018-10-02 DIAGNOSIS — E78.00 HYPERCHOLESTEROLEMIA: ICD-10-CM

## 2018-10-02 DIAGNOSIS — Z23 ENCOUNTER FOR IMMUNIZATION: ICD-10-CM

## 2018-10-02 DIAGNOSIS — E11.9 TYPE 2 DIABETES MELLITUS WITHOUT COMPLICATION, WITHOUT LONG-TERM CURRENT USE OF INSULIN (HCC): Primary | ICD-10-CM

## 2018-10-02 RX ORDER — ATORVASTATIN CALCIUM 40 MG/1
TABLET, FILM COATED ORAL
COMMUNITY
Start: 2018-10-02

## 2018-10-02 RX ORDER — FAMOTIDINE 20 MG/1
20 TABLET, FILM COATED ORAL 2 TIMES DAILY
COMMUNITY
Start: 2018-10-02

## 2018-10-02 NOTE — PROGRESS NOTES
Chief Complaint   Patient presents with   [facility] Follow Up     heart attack, had a heart cath on 9/19/18    Nasal Congestion    Foot Swelling     B/L     1. Have you been to the ER, urgent care clinic since your last visit? Hospitalized since your last visit?see visit reason. Went to [facility]     2. Have you seen or consulted any other health care providers outside of the [address] [place] [address] since your last visit? Include any pap smears or colon screening.  No

## 2018-10-02 NOTE — PROGRESS NOTES
HISTORY OF PRESENT ILLNESS  Shyam Howell is a 79 y.o. male. HPI  Patient here for follow up hospital stay  Dx with another MI, cath done 9/19, unable to do stent due to friability of the vessel, managing it medically  Did have some med changes, list was updated today  Admits that he is having more fluid retention that usual  Started taking 2 hctz through the day that has helped  Fluid started getting bad at night and affecting his breathing  Has cardio appt with Dr. Mariposa Valdez tomorrow  No refills neeeded    Patient is here today for flu shot. Denies any previous adr/se to the flu shot, no egg allergy and no recent illness or fever. ROS  A comprehensive review of system was obtained and negative except findings in the HPI    Visit Vitals    BP 92/58    Pulse 76    Temp 97.6 °F (36.4 °C)    Resp 17    Ht 5' 9\" (1.753 m)    Wt 138 lb (62.6 kg)    SpO2 99%    BMI 20.38 kg/m2     Physical Exam   Constitutional: He is oriented to person, place, and time. He appears well-developed and well-nourished. No distress. Cardiovascular: Normal rate and regular rhythm. No murmur heard. Pulmonary/Chest: Breath sounds normal. No respiratory distress. He has no wheezes. He has no rales. Musculoskeletal: He exhibits edema. Neurological: He is alert and oriented to person, place, and time. Nursing note and vitals reviewed. ASSESSMENT and PLAN  Encounter Diagnoses   Name Primary?     Type 2 diabetes mellitus without complication, without long-term current use of insulin (HCC) Yes    Essential hypertension     Hypercholesterolemia     Encounter for immunization      Orders Placed This Encounter    Influenza Vaccine Inactivated (IIV)(FLUAD), Subunit, Adjuvanted, IM, (11225)    atorvastatin (LIPITOR) 40 mg tablet    famotidine (PEPCID) 20 mg tablet     Flu shot given today  Med listed updated as well  Will see cardio tomorrow and encouraged him to discuss his fluid retention, may need med change    I have discussed the diagnosis with the patient and the intended plan as seen in the above orders. The patient has received an after-visit summary and questions were answered concerning future plans. Patient conveyed understanding of the plan at the time of the visit.     Loulou Dumont MSN, ANP  10/2/2018

## 2018-10-02 NOTE — MR AVS SNAPSHOT
[address] [place] [address] 
[phone] Patient: Francesca Otero MRN: [id number] NAD:6/88/1341 Visit Information Date & Time Provider Department Dept. Phone Encounter #  
 10/2/2018  9:30 AM Trina Juarez NP [facility] [phone] [id number] Upcoming Health Maintenance Date Due DTaP/Tdap/Td series (1 - Tdap) 8/20/1973 Shingrix Vaccine Age 50> (1 of 2) 8/20/2002 GLAUCOMA SCREENING Q2Y 8/20/2017 Influenza Age 5 to Adult 8/1/2018 COLONOSCOPY 8/5/2018 HEMOGLOBIN A1C Q6M 8/12/2018 Pneumococcal 65+ Low/Medium Risk (2 of 2 - PPSV23) 9/27/2018 MEDICARE YEARLY EXAM 9/27/2018 LIPID PANEL Q1 2/12/2019 Allergies as of 10/2/2018  Review Complete On: 10/2/2018 By: Ger Sifuentes LPN Severity Noted Reaction Type Reactions Pcn [Penicillins]  05/25/2010    Other (comments) Lock face Sulfa (Sulfonamide Antibiotics)  05/25/2010    Rash Current Immunizations  Reviewed on 9/29/2017 Name Date Influenza High Dose Vaccine PF 9/26/2017 Influenza Vaccine 10/14/2016, 9/1/2015 Influenza Vaccine (Tri) Adjuvanted  Incomplete Influenza Vaccine Split 10/9/2012, 11/4/2011, 11/18/2010 Pneumococcal Conjugate (PCV-13) 9/27/2017 Zoster Vaccine, Live 9/6/2017 Not reviewed this visit You Were Diagnosed With   
  
 Codes Comments Type 2 diabetes mellitus without complication, without long-term current use of insulin (HCC)    -  Primary ICD-10-CM: E11.9 ICD-9-CM: 250.00 Essential hypertension     ICD-10-CM: I10 
ICD-9-CM: 401.9 Hypercholesterolemia     ICD-10-CM: E78.00 ICD-9-CM: 272.0 Encounter for immunization     ICD-10-CM: X19 ICD-9-CM: V03.89 Vitals BP Pulse Temp Resp Height(growth percentile) Weight(growth percentile) 92/58 76 97.6 °F (36.4 °C) 17 5' 9\" (1.753 m) 138 lb (62.6 kg) SpO2 BMI Smoking Status 99% 20.38 kg/m2 Former Smoker Vitals History BMI and BSA Data Body Mass Index Body Surface Area  
 20.38 kg/m 2 1.75 m 2 [facility] [address] [facility] [address] [place] [address] [phone] Your Updated Medication List  
  
   
This list is accurate as of 10/2/18 10:01 AM.  Always use your most recent med list.  
  
  
  
  
 * albuterol 90 mcg/actuation inhaler Commonly known as:  PROVENTIL HFA, VENTOLIN HFA, PROAIR HFA Take 2 Puffs by inhalation every four (4) hours as needed for Wheezing or Shortness of Breath. * albuterol 2.5 mg /3 mL (0.083 %) nebulizer solution Commonly known as:  PROVENTIL VENTOLIN  
3 mL by Nebulization route every four (4) hours as needed for Wheezing. amLODIPine 10 mg tablet Commonly known as:  Rebekah Trent TAKE 1 TABLET EVERY DAY  
  
 aspirin 81 mg tablet Take 81 mg by mouth daily. atorvastatin 40 mg tablet Commonly known as:  LIPITOR  
TAKE 1 TABLET EVERY DAY  
  
 cloNIDine HCl 0.2 mg tablet Commonly known as:  CATAPRES  
TAKE 1 TABLET TWICE DAILY  
  
 famotidine 20 mg tablet Commonly known as:  PEPCID Take 1 Tab by mouth two (2) times a day. fluticasone 44 mcg/actuation inhaler Commonly known as:  FLOVENT HFA Take 2 Puffs by inhalation two (2) times a day. glucose blood VI test strips strip Commonly known as:  ONETOUCH ULTRA TEST  
by Does Not Apply route. Needs strips for one touch ultra 2 Once a day testing  
  
 hydroCHLOROthiazide 25 mg tablet Commonly known as:  HYDRODIURIL  
TAKE 1 TABLET EVERY DAY Lancets Misc Commonly known as:  ONETOUCH ULTRASOFT LANCETS  
by Does Not Apply route. Checks bg daily  
  
 lisinopril 20 mg tablet Commonly known as:  PRINIVIL, ZESTRIL  
TAKE 1 TABLET EVERY DAY  
  
 loratadine 10 mg tablet Commonly known as:  CLARITIN  
take 1 tablet by mouth once daily  
  
 metFORMIN  mg tablet Commonly known as:  GLUCOPHAGE XR  
TAKE 1 TABLET DAILY (WITH DINNER). metoprolol succinate 100 mg tablet Commonly known as:  TOPROL-XL  
TAKE 1 TABLET EVERY DAY  
  
 montelukast 10 mg tablet Commonly known as:  SINGULAIR Take 1 Tab by mouth daily. Take 1 po qd  
  
 nitroglycerin 0.4 mg SL tablet Commonly known as:  NITROSTAT PLACE 1 TABLET under the tongue as directed if needed for CHEST PAIN  
  
 prasugrel 10 mg tablet Commonly known as:  EFFIENT  
take 1 tablet by mouth once daily  
  
 tamsulosin 0.4 mg capsule Commonly known as:  FLOMAX Take 1 Cap by mouth daily. * Notice: This list has 2 medication(s) that are the same as other medications prescribed for you. Read the directions carefully, and ask your doctor or other care provider to review them with you. We Performed the Following ADMIN INFLUENZA VIRUS VAC [ [facility]] INFLUENZA VACCINE INACTIVATED (IIV), SUBUNIT, ADJUVANTED, IM [id number] CPT(R)] Introducing [facility] SERVICES! [employer] introduces [employer] patient portal. Now you can access parts of your medical record, email your doctor's office, and request medication refills online. 1. In your internet browser, go to [email or web address] 2. Click on the First Time User? Click Here link in the Sign In box. You will see the New Member Sign Up page. 3. Enter your Mov[employer] Access Code exactly as it appears below. You will not need to use this code after youve completed the sign-up process. If you do not sign up before the expiration date, you must request a new code. · Mov[employer] Access Code: [facility] Expires: 12/31/2018  9:12 AM 
 
4. Enter the last four digits of your Social Security Number (xxxx) and Date of Birth (mm/dd/yyyy) as indicated and click Submit. You will be taken to the next sign-up page. 5. Create a Movinary ID.  This will be your Mov[employer] login ID and cannot be changed, so think of one that is secure and easy to remember. 6. Create a V[employer]ormate password. You can change your password at any time. 7. Enter your Password Reset Question and Answer. This can be used at a later time if you forget your password. 8. Enter your e-mail address. You will receive e-mail notification when new information is available in [address] 9. Click Sign Up. You can now view and download portions of your medical record. 10. Click the Download Summary menu link to download a portable copy of your medical information. If you have questions, please visit the Frequently Asked Questions section of the [employer] website. Remember, V[employer]ate is NOT to be used for urgent needs. For medical emergencies, dial 911. Now available from your iPhone and Android! Please provide this summary of care documentation to your next provider. Your primary care clinician is listed as Ying Turner. If you have any questions after today's visit, please call [phone].

## 2018-10-24 ENCOUNTER — OFFICE VISIT (OUTPATIENT)
Dept: FAMILY MEDICINE CLINIC | Age: 68
End: 2018-10-24

## 2018-10-24 VITALS
HEART RATE: 52 BPM | OXYGEN SATURATION: 99 % | HEIGHT: 69 IN | SYSTOLIC BLOOD PRESSURE: 118 MMHG | BODY MASS INDEX: 20.88 KG/M2 | TEMPERATURE: 97.6 F | WEIGHT: 141 LBS | DIASTOLIC BLOOD PRESSURE: 67 MMHG | RESPIRATION RATE: 16 BRPM

## 2018-10-24 DIAGNOSIS — J01.00 ACUTE NON-RECURRENT MAXILLARY SINUSITIS: Primary | ICD-10-CM

## 2018-10-24 RX ORDER — CEFDINIR 300 MG/1
300 CAPSULE ORAL 2 TIMES DAILY
Qty: 20 CAP | Refills: 0 | Status: SHIPPED | OUTPATIENT
Start: 2018-10-24 | End: 2018-10-24 | Stop reason: SDUPTHER

## 2018-10-24 RX ORDER — CEFDINIR 300 MG/1
300 CAPSULE ORAL 2 TIMES DAILY
Qty: 20 CAP | Refills: 0 | Status: SHIPPED | OUTPATIENT
Start: 2018-10-24 | End: 2018-11-03

## 2018-10-24 NOTE — PROGRESS NOTES
1. Have you been to the ER, urgent care clinic since your last visit? Hospitalized since your last visit? No    2. Have you seen or consulted any other health care providers outside of the [address] [place] [address] since your last visit? Include any pap smears or colon screening.  No   Chief Complaint   Patient presents with    Jaw Pain     Right side, x10 days    Sinus Infection     x10 days

## 2018-10-24 NOTE — PROGRESS NOTES
Chief Complaint   Patient presents with    Jaw Pain     Right side, x10 days    Sinus Infection     x10 days     he is a 79y.o. year old male who presents for evalution. Pt states believes has infection in his bone. Has been having pain in jaw, lots of thick congestion, feels feverish but hasn't checked temperature. Has been taking Tylenol but not really helping. Course is worsening. Reviewed PmHx, RxHx, FmHx, SocHx, AllgHx and updated and dated in the chart. Review of Systems - negative except as listed above in the HPI    Objective:     Vitals:    10/24/18 0724   BP: 118/67   Pulse: (!) 52   Resp: 16   Temp: 97.6 °F (36.4 °C)   TempSrc: Oral   SpO2: 99%   Weight: 141 lb (64 kg)   Height: 5' 9\" (1.753 m)     Physical Examination: General appearance - alert, well appearing, and in no distress  Ears - bilateral TM's and external ear canals normal  Nose - mucosal congestion, mucosal erythema and sinus tenderness noted maxillary   Mouth - mucous membranes moist, pharynx normal without lesions and erythematous  Neck - bilateral symmetric anterior adenopathy  Chest - clear to auscultation, no wheezes, rales or rhonchi, symmetric air entry  Heart - normal rate, regular rhythm, normal S1, S2, no murmurs, rubs, clicks or gallops    Assessment/ Plan:   Diagnoses and all orders for this visit:    1. Acute non-recurrent maxillary sinusitis  -     cefdinir (OMNICEF) 300 mg capsule; Take 1 Cap by mouth two (2) times a day for 10 days. New rx. Discussed and encouraged rest and clear fluids, if congestion is thick should avoid dairy. Recommended hot showers with steam and elevating head of bed. May use Vitamin C or Echinacea in addition to current therapy. Pt voiced understanding regarding plan of care. Follow-up Disposition:  Return if symptoms worsen or fail to improve. I have discussed the diagnosis with the patient and the intended plan as seen in the above orders.   The patient has received an after-visit summary and questions were answered concerning future plans.      Medication Side Effects and Warnings were discussed with patient    Campbell Montgomery NP

## 2018-10-24 NOTE — PATIENT INSTRUCTIONS
Sinusitis: Care Instructions  Your Care Instructions    Sinusitis is an infection of the lining of the sinus cavities in your head. Sinusitis often follows a cold. It causes pain and pressure in your head and face. In most cases, sinusitis gets better on its own in 1 to 2 weeks. But some mild symptoms may last for several weeks. Sometimes antibiotics are needed. Follow-up care is a key part of your treatment and safety. Be sure to make and go to all appointments, and call your doctor if you are having problems. It's also a good idea to know your test results and keep a list of the medicines you take. How can you care for yourself at home? · Take an over-the-counter pain medicine, such as acetaminophen (Tylenol), ibuprofen (Advil, Motrin), or naproxen (Aleve). Read and follow all instructions on the label. · If the doctor prescribed antibiotics, take them as directed. Do not stop taking them just because you feel better. You need to take the full course of antibiotics. · Be careful when taking over-the-counter cold or flu medicines and Tylenol at the same time. Many of these medicines have acetaminophen, which is Tylenol. Read the labels to make sure that you are not taking more than the recommended dose. Too much acetaminophen (Tylenol) can be harmful. · Breathe warm, moist air from a steamy shower, a hot bath, or a sink filled with hot water. Avoid cold, dry air. Using a humidifier in your home may help. Follow the directions for cleaning the machine. · Use saline (saltwater) nasal washes to help keep your nasal passages open and wash out mucus and bacteria. You can buy saline nose drops at a grocery store or drugstore. Or you can make your own at home by adding 1 teaspoon of salt and 1 teaspoon of baking soda to 2 cups of distilled water. If you make your own, fill a bulb syringe with the solution, insert the tip into your nostril, and squeeze gently. [place] Drain your nose.   · Put a hot, wet towel or a warm gel pack on your face 3 or 4 times a day for 5 to 10 minutes each time. · Try a decongestant nasal spray like oxymetazoline (Afrin). Do not use it for more than 3 days in a row. Using it for more than 3 days can make your congestion worse. When should you call for help? Call your doctor now or seek immediate medical care if:    · You have new or worse swelling or redness in your face or around your eyes.     · You have a new or higher fever.    Watch closely for changes in your health, and be sure to contact your doctor if:    · You have new or worse facial pain.     · The mucus from your nose becomes thicker (like pus) or has new blood in it.     · You are not getting better as expected. Where can you learn more? Go to http://yony-charla.info/. Enter P880 in the search box to learn more about \"Sinusitis: Care Instructions. \"  Current as of: March 28, 2018  Content Version: 11.8  © 4244-8452 [employer]. Care instructions adapted under license by [employer] (which disclaims liability or warranty for this information). If you have questions about a medical condition or this instruction, always ask your healthcare professional. [place] [address] any warranty or liability for your use of this information.

## 2018-11-05 ENCOUNTER — DOCUMENTATION ONLY (OUTPATIENT)
Dept: FAMILY MEDICINE CLINIC | Age: 68
End: 2018-11-05

## 2018-11-05 NOTE — PROGRESS NOTES
Shelagh Najjar at [facility] called requesting copy of pts medication list as he has been admitted    Fax [phone]